# Patient Record
Sex: MALE | Race: WHITE
[De-identification: names, ages, dates, MRNs, and addresses within clinical notes are randomized per-mention and may not be internally consistent; named-entity substitution may affect disease eponyms.]

---

## 2017-01-16 ENCOUNTER — HOSPITAL ENCOUNTER (INPATIENT)
Dept: HOSPITAL 36 - ER | Age: 57
LOS: 5 days | Discharge: NURSING, REHAB OR LONG-TERM CARE | DRG: 391 | End: 2017-01-21
Attending: GENERAL PRACTICE | Admitting: GENERAL PRACTICE
Payer: MEDICARE

## 2017-01-16 VITALS — DIASTOLIC BLOOD PRESSURE: 85 MMHG | SYSTOLIC BLOOD PRESSURE: 124 MMHG

## 2017-01-16 DIAGNOSIS — M47.816: ICD-10-CM

## 2017-01-16 DIAGNOSIS — J45.909: ICD-10-CM

## 2017-01-16 DIAGNOSIS — J18.9: ICD-10-CM

## 2017-01-16 DIAGNOSIS — K52.9: Primary | ICD-10-CM

## 2017-01-16 DIAGNOSIS — Z98.890: ICD-10-CM

## 2017-01-16 DIAGNOSIS — K52.89: ICD-10-CM

## 2017-01-16 DIAGNOSIS — I10: ICD-10-CM

## 2017-01-16 DIAGNOSIS — J44.9: ICD-10-CM

## 2017-01-16 DIAGNOSIS — F17.210: ICD-10-CM

## 2017-01-16 DIAGNOSIS — N39.0: ICD-10-CM

## 2017-01-16 DIAGNOSIS — M19.90: ICD-10-CM

## 2017-01-16 DIAGNOSIS — Z88.8: ICD-10-CM

## 2017-01-16 DIAGNOSIS — Z88.5: ICD-10-CM

## 2017-01-16 LAB
ALBUMIN/GLOB SERPL: 1.4 {RATIO} (ref 1–1.8)
ALP SERPL-CCNC: 152 U/L (ref 34–104)
ALT SERPL-CCNC: 17 U/L (ref 7–52)
AMYLASE SERPL-CCNC: 38 U/L (ref 29–103)
ANION GAP SERPL CALC-SCNC: 11.6 MMOL/L (ref 7–16)
AST SERPL-CCNC: 15 U/L (ref 13–39)
BACTERIA #/AREA URNS HPF: (no result) /HPF
BASOPHILS NFR BLD AUTO: 0.2 % (ref 0–2)
BILIRUB SERPL-MCNC: 0.4 MG/DL (ref 0.3–1)
BILIRUB UR-MCNC: (no result) MG/DL
BUN SERPL-MCNC: 12 MG/DL (ref 7–25)
BUN/CREAT SERPL: 13.3
CALCIUM SERPL-MCNC: 10.2 MG/DL (ref 8.6–10.3)
CHLORIDE SERPL-SCNC: 108 MEQ/L (ref 98–107)
CO2 SERPL-SCNC: 21.2 MEQ/L (ref 21–31)
COLOR UR: YELLOW
CREAT SERPL-MCNC: 0.9 MG/DL (ref 0.7–1.3)
EOSINOPHIL NFR BLD AUTO: 1.2 % (ref 0–5)
EPI CELLS URNS QL MICRO: (no result) /LPF
ERYTHROCYTE [DISTWIDTH] IN BLOOD BY AUTOMATED COUNT: 12.6 % (ref 11.5–20)
GLOBULIN SER-MCNC: 3.2 GM/DL
GLUCOSE SERPL-MCNC: 112 MG/DL (ref 70–105)
GLUCOSE UR STRIP-MCNC: NEGATIVE MG/DL
HCT VFR BLD CALC: 40.6 % (ref 39–49)
HGB BLD-MCNC: 13.7 GM/DL (ref 13.2–17.3)
KETONES UR STRIP-MCNC: 15 MG/DL
LIPASE SERPL-CCNC: 18 U/L (ref 11–82)
LYMPHOCYTES NFR BLD AUTO: 12.9 % (ref 20–50)
MCH RBC QN AUTO: 31.7 PG (ref 26–30)
MCHC RBC AUTO-ENTMCNC: 33.8 PG (ref 28–36)
MCV RBC AUTO: 94 FL (ref 80–99)
MONOCYTES NFR BLD AUTO: 5.9 % (ref 2–10)
NEUTROPHILS # BLD: 4.6 TH/CMM (ref 1.8–8)
NEUTROPHILS NFR BLD AUTO: 79.8 % (ref 40–80)
PH UR STRIP: 5.5 [PH]
PLATELET # BLD: 334 TH/CMM (ref 150–400)
PMV BLD AUTO: 6.9 FL
POTASSIUM SERPL-SCNC: 3.8 MEQ/L (ref 3.5–5.1)
PROT UR STRIP-MCNC: 100 MG/DL
RBC # BLD AUTO: 4.32 MIL/CMM (ref 4.3–5.7)
RBC # UR STRIP: (no result) /UL
SODIUM SERPL-SCNC: 137 MEQ/L (ref 136–145)
UROBILINOGEN UR STRIP-ACNC: 0.2 E.U./DL (ref 0.2–1)
WBC # BLD AUTO: 5.7 TH/CMM (ref 4.8–10.8)
WBC #/AREA URNS HPF: (no result) /HPF (ref 0–5)

## 2017-01-16 PROCEDURE — Z7610: HCPCS

## 2017-01-16 PROCEDURE — A9537 TC99M MEBROFENIN: HCPCS

## 2017-01-16 PROCEDURE — Z7506: HCPCS

## 2017-01-16 RX ADMIN — HYDROMORPHONE HYDROCHLORIDE PRN MG: 1 INJECTION, SOLUTION INTRAMUSCULAR; INTRAVENOUS; SUBCUTANEOUS at 22:30

## 2017-01-16 RX ADMIN — LEVOFLOXACIN SCH MLS/HR: 5 INJECTION INTRAVENOUS at 22:30

## 2017-01-16 NOTE — DIAGNOSTIC IMAGING REPORT
CT scan abdomen and pelvis without intravenous contrast



HISTORY: Pain



Total DLP equals 293



CTDI equals 7.0



Axial sections were obtained from the xiphoid process down to the pubic

symphysis.



The exam is compared with prior study of November 12, 2016.



The liver exhibits a homogeneous parenchyma. No focal lesions. The

spleen appears normal. No abnormality seen in the region of the

pancreas. Bilateral punctate nonobstructing renal calculi are seen.

Little change from the prior study. Intraluminal density seen in the

gallbladder suggestive of cholelithiasis. If necessary, an ultrasound

exam would provide additional assessment.



There appears to be mild dilatation and wall thickening along the

ascending colon. Inflammatory change (colitis) cannot be excluded.

Clinical correlation is needed. No discrete abnormal masses or fluid

collections seen within the pelvis. Several colonic diverticula are

noted.



Degenerative changes noted to the spine. There is a stable 1.0 cm

sclerotic density noted within the left ilium of the pelvis. As an

isolated finding, the finding is probably related to an incidental "bone

island".



IMPRESSION:

1. Mild dilatation along with wall thickening along a segment of the

ascending colon. Inflammatory change (colitis or  diverticulitis) cannot

be excluded. Clinical correlation is needed

2. Stable bilateral punctate nonobstructing renal calculi

3. Intraluminal density within the gallbladder suggesting

cholelithiasis. An ultrasound exam would provide additional assessment.

4. Diverticulosis (see above)

## 2017-01-16 NOTE — ADMIT CRITERIA FORM
Admit Criteria Forms





- Admit Criteria


Diagnosis: 





                                                                         


                           ABDOMINAL PAIN





Clinical Indications for Admission to Inpatient Care


 


                                                                               

      (Place 'X' for any and all applicable criteria):





Admission is indicated for ANY ONE of the following(1)(2)(3)(4)(5):


[X ]I.      Inpatient admission required rather than observation care (Also use 

Abdominal Pain: Observation Care, 


           as appropriate) because of ANY ONE of the following:


            [ ]a)   Severe pain requiring acute inpatient management


            [ ]b)    Identification of etiology/finding that requires inpatient 

care (eg, aortic dissection, free air)


            [ ]c)    Absent bowel sounds with complete ileus(6)  


            [ ]d)    Suspected toxic megacolon


            [ ]e)    Severe electrolyte abnormalities requiring inpatient care


            [ ]f)     High fever or infection requiring inpatient admission as 

indicated by ANY ONE of following(7)(8):


                       [ ] i)    Appropriate outpatient or observational care 

antimicrobial treatment unavailable, not effective, or not feasible


                       [ ] ii)   Documented bacteremia 


                       [ ] iii)  Temperature > 104.9 degrees F (oral)


                       [ ] iv)  T >103.1 F (oral) or < 96.8 F(rectal) that does 

not respond to all emergency treatment measures


            [ ]g)     Signs of intestinal obstruction [B] 


            [ ]h)     Hemodynamic instability


            [ ]i)      IV fluid to replace significant ongoing losses (greater 

than 3 L/m2 per day) (12)(13)


            [ ]j)      Percutaneous or open drainage (eg, abscess, biliary tract

) procedures


            [ ]k)     Parenteral nutrition regimen that must be implemented on 

inpatient basis   


            [ X]l)      Other condition,treatment or monitoring requiring 

inpatient admission.


[ ]II.      Peritoneal signs present


[ ]III.     Surgery needed that cannot be performed on an ambulatory basis.


[ ]IV.    Evaluation requires patient to not eat or drink for extended period (

eg, more than 24 hours).





[ ]V.     Contraindications and/or Inappropriate clinical situations for 

Observational Care in patients with


           abdominal pain, when ANY ONE of the following is required:


           [ ]a)  Thorough evaluation is required to prevent catastrophic 

events due to delays in diagnosing  


                   (e.g.Mesenteric ischemia) 1,3


           [ ]b)  Patient with severe pathology or with chronic symptoms 

unlikely to improve in the ED stay (3)


[ ]VI.    General contraindications and/or Inappropriate clinical situations 

for Observational Care in patients


           with abdominal pain, when ANY ONE of the following is required:


           [ ]a)   Prediction of prolongation of LOS based on ANY ONE of the 

following may be considered as 


                    a contraindication for observational care 2, 3, 4, 5, 6, 7, 

8, 9, 10, 11


                    [ ]i)    Age > 65 yrs.


                    [ ]ii)   Patient arriving by ambulance


                    [ ]iii)  Patient with high acuity


                    [ ]iv)  Patient requiring vital sign monitoring


                    [ ]v)   Patient on IV medication


           [ ]b)   Systolic blood pressures  180mmHg 3,12


           [ ]c)   Patient with altered mental status including delirium and 

other alteration of consciousness, (3)


           [ ]d)   Patient whose discharge disposition will be to a skilled 

nursing home or rehabilitation home should 


                    not be managed in Emergency Department Observation Unit. 

CMS rule requires 3 days hospital stay before such placement.3,13


           [ ]e)   Patient with failure to thrive due to broad array of 

etiologies 3,16,17


           [ ]f)    Inability to ambulate 3,14








Extended stay beyond goal length of stay may be needed for(2)(3):


[ ]a)   Persistent abdominal pain with suspected intra-abdominal process


[ ]b)   Diagnosed condition requiring continued stay (e.g., pancreatitis, 

complicated diverticulitis)                                            


[ ]c)   Surgery (e.g., colectomy)                


    





The original [employer] content created by [employer] has been revised. 


The portions of the content which have been revised are identified through the 

use of italic text or in bold, and [email or web address] 


has neither reviewed nor approved the modified material.All other unmodified 

content is copyright  [facility].





Please see references footnoted in the original [facility] edition 

2016
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
## 2017-01-17 LAB
ALBUMIN/GLOB SERPL: 1.3 {RATIO} (ref 1–1.8)
ALP SERPL-CCNC: 116 U/L (ref 34–104)
ALT SERPL-CCNC: 12 U/L (ref 7–52)
ANION GAP SERPL CALC-SCNC: 7.9 MMOL/L (ref 7–16)
AST SERPL-CCNC: 11 U/L (ref 13–39)
BASOPHILS NFR BLD AUTO: 0.2 % (ref 0–2)
BILIRUB SERPL-MCNC: 0.4 MG/DL (ref 0.3–1)
BUN SERPL-MCNC: 11 MG/DL (ref 7–25)
BUN/CREAT SERPL: 12.2
CALCIUM SERPL-MCNC: 8.9 MG/DL (ref 8.6–10.3)
CHLORIDE SERPL-SCNC: 109 MEQ/L (ref 98–107)
CO2 SERPL-SCNC: 22.7 MEQ/L (ref 21–31)
CREAT SERPL-MCNC: 0.9 MG/DL (ref 0.7–1.3)
EOSINOPHIL NFR BLD AUTO: 6.8 % (ref 0–5)
ERYTHROCYTE [DISTWIDTH] IN BLOOD BY AUTOMATED COUNT: 12.5 % (ref 11.5–20)
GLOBULIN SER-MCNC: 2.8 GM/DL
GLUCOSE SERPL-MCNC: 95 MG/DL (ref 70–105)
HCT VFR BLD CALC: 34.9 % (ref 39–49)
HGB BLD-MCNC: 12.3 GM/DL (ref 13.2–17.3)
LYMPHOCYTES NFR BLD AUTO: 28.2 % (ref 20–50)
MCH RBC QN AUTO: 33 PG (ref 26–30)
MCHC RBC AUTO-ENTMCNC: 35.2 PG (ref 28–36)
MCV RBC AUTO: 93.6 FL (ref 80–99)
MONOCYTES NFR BLD AUTO: 8 % (ref 2–10)
NEUTROPHILS # BLD: 3 TH/CMM (ref 1.8–8)
NEUTROPHILS NFR BLD AUTO: 56.8 % (ref 40–80)
PLATELET # BLD: 276 TH/CMM (ref 150–400)
PMV BLD AUTO: 7 FL
POTASSIUM SERPL-SCNC: 3.6 MEQ/L (ref 3.5–5.1)
RBC # BLD AUTO: 3.73 MIL/CMM (ref 4.3–5.7)
SODIUM SERPL-SCNC: 136 MEQ/L (ref 136–145)
WBC # BLD AUTO: 5.1 TH/CMM (ref 4.8–10.8)

## 2017-01-17 RX ADMIN — HYDROMORPHONE HYDROCHLORIDE PRN MG: 1 INJECTION, SOLUTION INTRAMUSCULAR; INTRAVENOUS; SUBCUTANEOUS at 10:14

## 2017-01-17 RX ADMIN — ALBUTEROL SULFATE PRN MG: 2.5 SOLUTION RESPIRATORY (INHALATION) at 20:30

## 2017-01-17 RX ADMIN — HYDROMORPHONE HYDROCHLORIDE PRN MG: 1 INJECTION, SOLUTION INTRAMUSCULAR; INTRAVENOUS; SUBCUTANEOUS at 16:18

## 2017-01-17 RX ADMIN — METRONIDAZOLE SCH MLS/HR: 500 INJECTION, SOLUTION INTRAVENOUS at 12:10

## 2017-01-17 RX ADMIN — ALBUTEROL SULFATE PRN MG: 2.5 SOLUTION RESPIRATORY (INHALATION) at 10:48

## 2017-01-17 RX ADMIN — ALBUTEROL SULFATE PRN MG: 2.5 SOLUTION RESPIRATORY (INHALATION) at 04:16

## 2017-01-17 RX ADMIN — METRONIDAZOLE SCH MLS/HR: 500 INJECTION, SOLUTION INTRAVENOUS at 20:48

## 2017-01-17 RX ADMIN — LEVOFLOXACIN SCH MLS/HR: 5 INJECTION INTRAVENOUS at 22:22

## 2017-01-17 RX ADMIN — METRONIDAZOLE SCH MLS/HR: 500 INJECTION, SOLUTION INTRAVENOUS at 05:17

## 2017-01-17 RX ADMIN — HYDROMORPHONE HYDROCHLORIDE PRN MG: 1 INJECTION, SOLUTION INTRAMUSCULAR; INTRAVENOUS; SUBCUTANEOUS at 04:10

## 2017-01-17 NOTE — GENERAL PROGRESS NOTE
Subjective





- Review of Systems


Service Date: 01/17/17


Subjective: 


I want pain medication every 4 hours





Objective





- Results


Result Diagrams: 


 01/16/17 14:20





 01/16/17 14:20


Recent Labs: 


 Laboratory Last Values











WBC  5.7 Th/cmm (4.8-10.8)   01/16/17  14:20    


 


RBC  4.32 Mil/cmm (4.30-5.70)   01/16/17  14:20    


 


Hgb  13.7 gm/dL (13.2-17.3)   01/16/17  14:20    


 


Hct  40.6 % (39.0-49.0)   01/16/17  14:20    


 


MCV  94.0 fl (80-99)   01/16/17  14:20    


 


MCH  31.7 pg (26.0-30.0)  H  01/16/17  14:20    


 


MCHC Differential  33.8 pg (28.0-36.0)   01/16/17  14:20    


 


RDW  12.6 % (11.5-20.0)   01/16/17  14:20    


 


Plt Count  334 Th/cmm (150-400)  D 01/16/17  14:20    


 


MPV  6.9 fl  01/16/17  14:20    


 


Neutrophils %  79.8 % (40.0-80.0)   01/16/17  14:20    


 


Lymphocytes %  12.9 % (20.0-50.0)  L  01/16/17  14:20    


 


Monocytes %  5.9 % (2.0-10.0)   01/16/17  14:20    


 


Eosinophils %  1.2 % (0.0-5.0)   01/16/17  14:20    


 


Basophils %  0.2 % (0.0-2.0)   01/16/17  14:20    


 


Sodium  137 mEq/L (136-145)   01/16/17  14:20    


 


Potassium  3.8 mEq/L (3.5-5.1)   01/16/17  14:20    


 


Chloride  108 mEq/L ()  H  01/16/17  14:20    


 


Carbon Dioxide  21.2 mEq/L (21.0-31.0)   01/16/17  14:20    


 


Anion Gap  11.6  (7.0-16.0)   01/16/17  14:20    


 


BUN  12 mg/dL (7-25)   01/16/17  14:20    


 


Creatinine  0.9 mg/dL (0.7-1.3)   01/16/17  14:20    


 


Est GFR ( Amer)  > 60.0 ml/min  01/16/17  14:20    


 


Est GFR (Non-Af Amer)  > 60.0 ml/min  01/16/17  14:20    


 


BUN/Creatinine Ratio  13.3   01/16/17  14:20    


 


Glucose  112 mg/dL ()  H  01/16/17  14:20    


 


Whole Bld Lactic Acid  1.04 mmol/L (0.60-2.00)   01/16/17  14:20    


 


Calcium  10.2 mg/dL (8.6-10.3)   01/16/17  14:20    


 


Total Bilirubin  0.4 mg/dL (0.3-1.0)   01/16/17  14:20    


 


AST  15 U/L (13-39)   01/16/17  14:20    


 


ALT  17 U/L (7-52)   01/16/17  14:20    


 


Alkaline Phosphatase  152 U/L ()  H  01/16/17  14:20    


 


Troponin I  < 0.01 ng/mL (0.01-0.05)  L  01/16/17  14:20    


 


Total Protein  7.6 gm/dL (6.0-8.3)   01/16/17  14:20    


 


Albumin  4.4 gm/dL (4.2-5.5)   01/16/17  14:20    


 


Globulin  3.2 gm/dL  01/16/17  14:20    


 


Albumin/Globulin Ratio  1.4  (1.0-1.8)   01/16/17  14:20    


 


Amylase  38 U/L ()   01/16/17  14:20    


 


Lipase  18 U/L (11-82)   01/16/17  14:20    


 


Urine Source  RANDOM   01/16/17  15:55    


 


Urine Color  YELLOW   01/16/17  15:55    


 


Urine Clarity  CLEAR  (CLEAR)   01/16/17  15:55    


 


Urine pH  5.5   01/16/17  15:55    


 


Ur Specific Gravity  1.030  (1.005-1.030)   01/16/17  15:55    


 


Urine Protein  100 mg/dL (NEGATIVE)  H  01/16/17  15:55    


 


Urine Glucose (UA)  NEGATIVE mg/dL (NEGATIVE)   01/16/17  15:55    


 


Urine Ketones  15 mg/dL (NEGATIVE)  H  01/16/17  15:55    


 


Urine Blood  MODERATE  (NEGATIVE)  H  01/16/17  15:55    


 


Urine Nitrate  NEGATIVE  (NEGATIVE)   01/16/17  15:55    


 


Urine Bilirubin  SMALL  (NEGATIVE)  H  01/16/17  15:55    


 


Urine Urobilinogen  0.2 E.U./dL (0.2 - 1.0)   01/16/17  15:55    


 


Ur Leukocyte Esterase  NEGATIVE  (NEGATIVE)   01/16/17  15:55    


 


Urine RBC  10-25 /hpf (0-5)  H  01/16/17  15:55    


 


Urine WBC  2-5 /hpf (0-5)  H  01/16/17  15:55    


 


Ur Epithelial Cells  NONE SEEN /lpf (FEW)   01/16/17  15:55    


 


Urine Bacteria  NONE SEEN /hpf (NONE SEEN)   01/16/17  15:55    














- Physical Exam


Vitals and I&O: 


 Vital Signs











Temp  98.2 F   01/17/17 03:58


 


Pulse  85   01/17/17 08:31


 


Resp  18   01/17/17 07:07


 


BP  154/98   01/17/17 08:31


 


Pulse Ox  94   01/17/17 07:07








 Intake & Output











 01/16/17 01/17/17 01/17/17





 18:59 06:59 18:59


 


Intake Total  200 


 


Output Total  200 


 


Balance  0 


 


Intake:   


 


  Oral  200 


 


Output:   


 


  Urine  200 


 


Other:   


 


  Stool Characteristics  Soft 











Active Medications: 


 Current Medications





Albuterol Sulfate (Albuterol 2.5mg/3ml Neb Ud)  2.5 mg HHN Q8HR PRN


   PRN Reason: sob


   Stop: 03/17/17 21:50


   Last Admin: 01/17/17 04:16 Dose:  2.5 mg


Amlodipine Besylate (Norvasc)  10 mg PO DAILY [facility]


   Stop: 03/18/17 08:59


Hydromorphone HCl (Dilaudid)  1 mg IVP Q6HR PRN


   PRN Reason: Pain (Moderate)


   Stop: 03/17/17 21:30


   Last Admin: 01/17/17 04:10 Dose:  1 mg


Sodium Chloride (Nacl 0.9%)  1,000 mls @ 75 mls/hr IV .[id number] [facility]


   Stop: 03/17/17 21:29


   Last Admin: 01/16/17 22:00 Dose:  75 mls/hr


Levofloxacin (Levaquin Pb)  500 mg in 100 mls @ 100 mls/hr IV Q24HR [facility]


   Stop: 03/17/17 21:29


   Last Admin: 01/16/17 22:30 Dose:  100 mls/hr


Metronidazole (Flagyl)  500 mg in 100 mls @ 100 mls/hr IV Q8HR [facility]


   Stop: 03/18/17 04:59


   Last Admin: 01/17/17 05:17 Dose:  100 mls/hr


Propranolol HCl (Inderal)  40 mg PO DAILY [facility]


   Stop: 03/18/17 08:59


   Last Admin: 01/17/17 08:31 Dose:  40 mg








General: Alert, Oriented x3, Cooperative


HEENT: Atraumatic


Neck: Supple


Cardiovascular: Regular rate


Lungs: Clear to auscultation


Abdomen: Other (Pain at palpation, BS increased)


Extremities: Other (No edema)


Neurological: Normal gait


Skin: Other (Multiplr skin scars in abdomen)


Psych/Mental Status: Mental status NL





- Procedures


Procedures: 


 Procedures











Procedure Code Date


 


INJECT/INFUSE NEC 99.29 03/30/15


 


VENOUS PUNCTURE NEC 38.99 10/18/14














Assessment/Plan





- Problem List


Patient Problems: 


All Active Problems





GENERALIZED ABDOMINAL PAIN (Acute) 


Abdominal pain (Active) R10.9


Kidney stone (Active 07/13/13) N20.0


Abdominal pain in male (Acute) R10.9


Dyspnea (Acute) R06.00


Flank pain (Acute 04/18/15) R10.9


Flank pain, chronic (Acute) R10.9


Left flank pain (Acute) R10.9


Lower abdominal pain (Acute) R10.30











- Assessment


Assessment: 


Pain is awake, alert, calm.  Dx: Abdominal pain possible secondary to colitis, 

UTI, HTN, COPD





- Plan


Plan: 


Patient in IV levaquin and metronidazole, IV ns, Dilaudid.  Awaiting GI eval.

## 2017-01-17 NOTE — DIAGNOSTIC IMAGING REPORT
Abdominal ultrasound



HISTORY: Pain



The liver appears enlarged. No focal lesions. The exam of the

gallbladder demonstrates 2 intraluminal echogenic densities. Despite the

absence of acoustic shadowing, findings are most likely related to

gallstones. The largest measures 9 mm. In addition, several low-level

intraluminal echoes that may be related to "sludge" noted. No biliary

dilatation (common bile duct is 3 mm). The pancreas is not well

visualized due to bowel gas. The kidneys appear normal bilaterally. No

other definite retroperitoneal or intra-abdominal abnormalities.



IMPRESSION:

1. Findings consistent with cholelithiasis

2. Hepatomegaly

## 2017-01-17 NOTE — HISTORY & PHYSICAL
CHIEF COMPLAINT:  Abdominal pain.



HISTORY OF PRESENT ILLNESS:  This is the case of a white male who came referring

abdominal pain for 1 week.  The patient referred occasionally pain radiates to

the back and both sides of the abdomen.  Pain is 8-10 and increased when walks

or move.  The patient referred that when he gets the pain, he feels the

sensation to have a bowel movement.  The patient had nausea with vomiting 3

times yesterday.  No diarrhea.



PAST MEDICAL HISTORY:  The patient has past medical history of hypertension,

asthma, COPD, history of multiple renal stones that required lithotripsy and

surgery, osteoarthritis, and ____ skin and bones ____ in the abdomen.



SOCIAL HISTORY:  The patient smokes half a pack daily.  The patient denies use

of alcohol or drugs.  The patient lives with wife at home.



FAMILY HISTORY:  Unremarkable.



REVIEW OF SYSTEMS:

GENERAL:  The patient denies fever or chills.

LUNGS:  The patient denies shortness of breath.

HEART:  The patient denies chest pain.

ABDOMEN:  The patient referred abdominal pain.

EXTREMITIES:  Unremarkable.

NEUROLOGICAL:  Unremarkable.



PHYSICAL EXAMINATION:

GENERAL:  Does reveal fairly nourished and developed white male, awake, alert

and complaining of abdominal pain.

HEENT:  Head is normocephalic and atraumatic.  Eyes:  Pupils are reactive to

light.  Nose:  No evidence of nasal obstruction.  Ears:  No evidence of any

discharge.  Mouth, some teeth missing with multiple caries.

LUNGS:  Bilateral air entry.  No wheezing, no crackles.

HEART:  Regular rate and rhythm.

ABDOMEN:  Soft, tender on palpation in all abdomen.  Shows scars for ____ in all

abdomen.  Bowel sound is increased.

EXTREMITIES:  Full movement of all extremities.  No edema.

NEUROLOGIC:  The patient is awake, alert, in some distress secondary to

abdominal pain.  Nerves 2-12 grossly intact.  No neurological deficit at this

moment.



IMPRESSION:

1.  Abdominal pain, possible secondary to colitis.

2.  Urinary tract infection.

3.  Hypertension.

4.  Asthma.

5.  Chronic obstructive pulmonary disease.



PLAN:

1.  The patient will be admitted in the medical surgical floor.

2.  Dilaudid for pain control.

3.  Levaquin.

4.  Metronidazole.

5.  IV normal saline.

6.  Consult with GI.

7.  Continue with home medications.





DD: 01/17/2017 09:04

DT: 01/17/2017 11:31

JOB# [id number]  [id number]

## 2017-01-18 LAB
ALBUMIN/GLOB SERPL: 1.2 {RATIO} (ref 1–1.8)
ALP SERPL-CCNC: 122 U/L (ref 34–104)
ALT SERPL-CCNC: 13 U/L (ref 7–52)
ANION GAP SERPL CALC-SCNC: 11 MMOL/L (ref 7–16)
AST SERPL-CCNC: 15 U/L (ref 13–39)
BASOPHILS NFR BLD AUTO: 0.3 % (ref 0–2)
BILIRUB SERPL-MCNC: 0.4 MG/DL (ref 0.3–1)
BUN SERPL-MCNC: 8 MG/DL (ref 7–25)
BUN/CREAT SERPL: 11.4
CALCIUM SERPL-MCNC: 9.2 MG/DL (ref 8.6–10.3)
CHLORIDE SERPL-SCNC: 107 MEQ/L (ref 98–107)
CO2 SERPL-SCNC: 22.2 MEQ/L (ref 21–31)
CREAT SERPL-MCNC: 0.7 MG/DL (ref 0.7–1.3)
EOSINOPHIL NFR BLD AUTO: 5.3 % (ref 0–5)
ERYTHROCYTE [DISTWIDTH] IN BLOOD BY AUTOMATED COUNT: 12.5 % (ref 11.5–20)
GLOBULIN SER-MCNC: 3.1 GM/DL
GLUCOSE SERPL-MCNC: 88 MG/DL (ref 70–105)
HCT VFR BLD CALC: 39.3 % (ref 39–49)
HGB BLD-MCNC: 13.4 GM/DL (ref 13.2–17.3)
INR PPP: 1.06 (ref 0.5–1.4)
LYMPHOCYTES NFR BLD AUTO: 28.4 % (ref 20–50)
MCH RBC QN AUTO: 31.9 PG (ref 26–30)
MCHC RBC AUTO-ENTMCNC: 34 PG (ref 28–36)
MCV RBC AUTO: 93.9 FL (ref 80–99)
MONOCYTES NFR BLD AUTO: 8.7 % (ref 2–10)
NEUTROPHILS # BLD: 3.1 TH/CMM (ref 1.8–8)
NEUTROPHILS NFR BLD AUTO: 57.3 % (ref 40–80)
PLATELET # BLD: 277 TH/CMM (ref 150–400)
PMV BLD AUTO: 7 FL
POTASSIUM SERPL-SCNC: 3.2 MEQ/L (ref 3.5–5.1)
PROTHROMBIN TIME: 10.5 SECONDS (ref 9.5–11.5)
RBC # BLD AUTO: 4.19 MIL/CMM (ref 4.3–5.7)
SODIUM SERPL-SCNC: 137 MEQ/L (ref 136–145)
WBC # BLD AUTO: 5.4 TH/CMM (ref 4.8–10.8)

## 2017-01-18 PROCEDURE — 0DBK8ZX EXCISION OF ASCENDING COLON, VIA NATURAL OR ARTIFICIAL OPENING ENDOSCOPIC, DIAGNOSTIC: ICD-10-PCS

## 2017-01-18 RX ADMIN — CASTOR OIL AND BALSAM, PERU SCH APPL: 788; 87 OINTMENT TOPICAL at 22:18

## 2017-01-18 RX ADMIN — LEVOFLOXACIN SCH MLS/HR: 5 INJECTION INTRAVENOUS at 23:55

## 2017-01-18 RX ADMIN — HYDROMORPHONE HYDROCHLORIDE PRN MG: 1 INJECTION, SOLUTION INTRAMUSCULAR; INTRAVENOUS; SUBCUTANEOUS at 22:15

## 2017-01-18 RX ADMIN — METRONIDAZOLE SCH MLS/HR: 500 INJECTION, SOLUTION INTRAVENOUS at 05:29

## 2017-01-18 RX ADMIN — ALBUTEROL SULFATE PRN MG: 2.5 SOLUTION RESPIRATORY (INHALATION) at 05:44

## 2017-01-18 RX ADMIN — HYDROMORPHONE HYDROCHLORIDE PRN MG: 1 INJECTION, SOLUTION INTRAMUSCULAR; INTRAVENOUS; SUBCUTANEOUS at 16:09

## 2017-01-18 RX ADMIN — HYDROMORPHONE HYDROCHLORIDE PRN MG: 1 INJECTION, SOLUTION INTRAMUSCULAR; INTRAVENOUS; SUBCUTANEOUS at 02:26

## 2017-01-18 RX ADMIN — ALBUTEROL SULFATE PRN MG: 2.5 SOLUTION RESPIRATORY (INHALATION) at 15:48

## 2017-01-18 RX ADMIN — METRONIDAZOLE SCH MLS/HR: 500 INJECTION, SOLUTION INTRAVENOUS at 22:10

## 2017-01-18 RX ADMIN — HYDROMORPHONE HYDROCHLORIDE PRN MG: 1 INJECTION, SOLUTION INTRAMUSCULAR; INTRAVENOUS; SUBCUTANEOUS at 10:03

## 2017-01-18 RX ADMIN — METRONIDAZOLE SCH MLS/HR: 500 INJECTION, SOLUTION INTRAVENOUS at 12:31

## 2017-01-18 NOTE — DIAGNOSTIC IMAGING REPORT
CHEST X-RAY: AP view



INDICATION: Shortness of breath



COMPARISON: Chest x-ray 9/12/2016



FINDINGS: COPD lung changes are seen with developing increased right mid

lung markings. No pleural abnormalities. Heart size is normal. There is

probable minimal atherosclerosis of the aortic arch.



IMPRESSION:



COPD lung changes with slight increased right mid lung markings.

Developing infiltrate in this region cannot be excluded. Follow-up is

recommended.

## 2017-01-18 NOTE — GENERAL PROGRESS NOTE
Subjective





- Review of Systems


Service Date: 01/18/17


Subjective: 


I want more pain medication





Objective





- Results


Result Diagrams: 


 01/18/17 05:31





 01/18/17 05:31


Recent Labs: 


 Laboratory Last Values











WBC  5.4 Th/cmm (4.8-10.8)   01/18/17  05:31    


 


RBC  4.19 Mil/cmm (4.30-5.70)  L  01/18/17  05:31    


 


Hgb  13.4 gm/dL (13.2-17.3)   01/18/17  05:31    


 


Hct  39.3 % (39.0-49.0)  D 01/18/17  05:31    


 


MCV  93.9 fl (80-99)   01/18/17  05:31    


 


MCH  31.9 pg (26.0-30.0)  H  01/18/17  05:31    


 


MCHC Differential  34.0 pg (28.0-36.0)   01/18/17  05:31    


 


RDW  12.5 % (11.5-20.0)   01/18/17  05:31    


 


Plt Count  277 Th/cmm (150-400)   01/18/17  05:31    


 


MPV  7.0 fl  01/18/17  05:31    


 


Neutrophils %  57.3 % (40.0-80.0)   01/18/17  05:31    


 


Lymphocytes %  28.4 % (20.0-50.0)   01/18/17  05:31    


 


Monocytes %  8.7 % (2.0-10.0)   01/18/17  05:31    


 


Eosinophils %  5.3 % (0.0-5.0)  H  01/18/17  05:31    


 


Basophils %  0.3 % (0.0-2.0)   01/18/17  05:31    


 


PT  10.5 SECONDS (9.5-11.5)   01/18/17  05:31    


 


INR  1.06  (0.5-1.4)   01/18/17  05:31    


 


Sodium  137 mEq/L (136-145)   01/18/17  05:31    


 


Potassium  3.2 mEq/L (3.5-5.1)  L  01/18/17  05:31    


 


Chloride  107 mEq/L ()   01/18/17  05:31    


 


Carbon Dioxide  22.2 mEq/L (21.0-31.0)   01/18/17  05:31    


 


Anion Gap  11.0  (7.0-16.0)   01/18/17  05:31    


 


BUN  8 mg/dL (7-25)   01/18/17  05:31    


 


Creatinine  0.7 mg/dL (0.7-1.3)   01/18/17  05:31    


 


Est GFR ( Amer)  > 60.0 ml/min  01/18/17  05:31    


 


Est GFR (Non-Af Amer)  > 60.0 ml/min  01/18/17  05:31    


 


BUN/Creatinine Ratio  11.4   01/18/17  05:31    


 


Glucose  88 mg/dL ()   01/18/17  05:31    


 


Whole Bld Lactic Acid  1.06 mmol/L (0.60-2.00)   01/17/17  13:05    


 


Calcium  9.2 mg/dL (8.6-10.3)   01/18/17  05:31    


 


Total Bilirubin  0.4 mg/dL (0.3-1.0)   01/18/17  05:31    


 


AST  15 U/L (13-39)   01/18/17  05:31    


 


ALT  13 U/L (7-52)   01/18/17  05:31    


 


Alkaline Phosphatase  122 U/L ()  H  01/18/17  05:31    


 


Troponin I  < 0.01 ng/mL (0.01-0.05)  L  01/16/17  14:20    


 


Total Protein  6.8 gm/dL (6.0-8.3)   01/18/17  05:31    


 


Albumin  3.7 gm/dL (4.2-5.5)  L  01/18/17  05:31    


 


Globulin  3.1 gm/dL  01/18/17  05:31    


 


Albumin/Globulin Ratio  1.2  (1.0-1.8)   01/18/17  05:31    


 


Amylase  38 U/L ()   01/16/17  14:20    


 


Lipase  18 U/L (11-82)   01/16/17  14:20    


 


Urine Source  RANDOM   01/16/17  15:55    


 


Urine Color  YELLOW   01/16/17  15:55    


 


Urine Clarity  CLEAR  (CLEAR)   01/16/17  15:55    


 


Urine pH  5.5   01/16/17  15:55    


 


Ur Specific Gravity  1.030  (1.005-1.030)   01/16/17  15:55    


 


Urine Protein  100 mg/dL (NEGATIVE)  H  01/16/17  15:55    


 


Urine Glucose (UA)  NEGATIVE mg/dL (NEGATIVE)   01/16/17  15:55    


 


Urine Ketones  15 mg/dL (NEGATIVE)  H  01/16/17  15:55    


 


Urine Blood  MODERATE  (NEGATIVE)  H  01/16/17  15:55    


 


Urine Nitrate  NEGATIVE  (NEGATIVE)   01/16/17  15:55    


 


Urine Bilirubin  SMALL  (NEGATIVE)  H  01/16/17  15:55    


 


Urine Urobilinogen  0.2 E.U./dL (0.2 - 1.0)   01/16/17  15:55    


 


Ur Leukocyte Esterase  NEGATIVE  (NEGATIVE)   01/16/17  15:55    


 


Urine RBC  10-25 /hpf (0-5)  H  01/16/17  15:55    


 


Urine WBC  2-5 /hpf (0-5)  H  01/16/17  15:55    


 


Ur Epithelial Cells  NONE SEEN /lpf (FEW)   01/16/17  15:55    


 


Urine Bacteria  NONE SEEN /hpf (NONE SEEN)   01/16/17  15:55    














- Physical Exam


Vitals and I&O: 


 Vital Signs











Temp  97.8 F   01/18/17 08:00


 


Pulse  79   01/18/17 08:09


 


Resp  18   01/18/17 08:00


 


BP  157/99   01/18/17 08:09


 


Pulse Ox  96   01/18/17 08:00








 Intake & Output











 01/17/17 01/18/17 01/18/17





 18:59 06:59 18:59


 


Intake Total 1600 2800 


 


Output Total  409 


 


Balance 1600 2391 


 


Intake:   


 


  Intake, IV Amount 1100 300 


 


    Levofloxacin 500mg/100mL  100 





    500 mg In 100 ml @ 100   





    mls/hr IV Q24HR [facility] Rx#:   





    [id number]   


 


    Sodium Chloride 0.9% 1, 1000  





    000 ml @ 75 mls/hr IV .   





    [id number] [facility] Rx#:[id number]   


 


    metroNIDAZOLE 500mg/ 200 





    100mL 500 mg In 100 ml @   





    100 mls/hr IV Q8HR [facility] Rx   





    #:[id number]   


 


  Oral 500 2500 


 


Output:   


 


  Urine  400 


 


  Stool  9 


 


Other:   


 


  # Voids 4 6 


 


  # Bowel Movements 5 12 











Active Medications: 


 Current Medications





Albuterol Sulfate (Albuterol 2.5mg/3ml Neb Ud)  2.5 mg HHN Q8HR PRN


   PRN Reason: sob


   Stop: 03/17/17 21:50


   Last Admin: 01/18/17 05:44 Dose:  2.5 mg


Amlodipine Besylate (Norvasc)  10 mg PO DAILY [facility]


   Stop: 03/18/17 08:59


   Last Admin: 01/18/17 08:09 Dose:  10 mg


Hydromorphone HCl (Dilaudid)  1 mg IVP Q6HR PRN


   PRN Reason: Pain (Moderate)


   Stop: 03/17/17 21:30


   Last Admin: 01/18/17 02:26 Dose:  1 mg


Sodium Chloride (Nacl 0.9%)  1,000 mls @ 75 mls/hr IV .[id number] [facility]


   Stop: 03/17/17 21:29


   Last Admin: 01/17/17 17:20 Dose:  75 mls/hr


Levofloxacin (Levaquin Pb)  500 mg in 100 mls @ 100 mls/hr IV Q24HR [facility]


   Stop: 03/17/17 21:29


   Last Infusion: 01/17/17 23:25 Dose:  Infused


Metronidazole (Flagyl)  500 mg in 100 mls @ 100 mls/hr IV Q8HR [facility]


   Stop: 03/18/17 04:59


   Last Infusion: 01/18/17 06:28 Dose:  Infused








General: Alert, Oriented x3, Cooperative, No acute distress


HEENT: Atraumatic


Neck: Supple


Cardiovascular: Regular rate


Lungs: Clear to auscultation


Abdomen: Bowel sounds, Soft (Tender at palpation )


Extremities: Other (No edema)


Neurological: Normal gait


Skin: Other (Warm and dry)


Psych/Mental Status: Mental status NL





- Procedures


Procedures: 


 Procedures











Procedure Code Date


 


INJECT/INFUSE NEC 99.29 03/30/15


 


VENOUS PUNCTURE NEC 38.99 10/18/14














Assessment/Plan





- Problem List


Patient Problems: 


All Active Problems





GENERALIZED ABDOMINAL PAIN (Acute) 


Abdominal pain (Active) R10.9


Kidney stone (Active 07/13/13) N20.0


Abdominal pain in male (Acute) R10.9


Dyspnea (Acute) R06.00


Flank pain (Acute 04/18/15) R10.9


Flank pain, chronic (Acute) R10.9


Left flank pain (Acute) R10.9


Lower abdominal pain (Acute) R10.30











- Assessment


Assessment: 


Pain is awake, alert, calm.  Dx: Abdominal pain possible secondary to colitis, 

UTI, HTN, COPD





- Plan


Plan: 


Patient in IV levaquin and metronidazole, IV ns, Dilaudid.  Colonoscopy will be 

done today

## 2017-01-18 NOTE — OPERATIVE REPORT
INPATIENT GASTROINTESTINAL PROCEDURE



PROCEDURE:  Colonoscopy with biopsy.



REFERRING PHYSICIAN:  Dr. Efra Hayes.



REASON FOR PROCEDURE:  Colitis.



CONSENT:  Risks, benefits, alternatives, nature, indication, possible outcomes

were discussed.  Mentioned bleeding, infection, perforation, death, disability,

cardiopulmonary distress and arrest, missed lesion and cancers, need for

surgery.  The patient expressed understanding and provided informed consent.



PREOPERATIVE DIAGNOSIS:  Colitis.



POSTOPERATIVE DIAGNOSIS:  Right-sided colitis.



MEDICATIONS:  Provided by anesthesiologist.



DESCRIPTION OF PROCEDURE:  The patient was placed on left side.  Rectal exam was

performed and was normal.  Pediatric colonoscope was advanced from the anus to

the cecum.  The appendiceal orifices were seen.  There was pseudomembranous

colitis in this particular area in the proximal ascending colon.  Terminal ileum

was intubated and appeared to be normal.  Scope slowly withdrawn ____ mucosa

along the way.  Stool was collected.  Biopsies were taken in the right side of

the colon.  Once in the rectum, retroflexion was performed, scope was

straightened and removed along with air.



COMPLICATIONS:  None.



FINDINGS:

1.  Colitis, primarily in the right side of the colon, status post biopsy and

stool collection.

2.  Normal terminal ileum.



RECOMMENDATIONS:

1.  Follow up on biopsy and stools cultures.

2.  Continue antibiotics.



Thank you for allowing me to participate.  Please call me if you have any

questions.





DD: 01/18/2017 09:08

DT: 01/18/2017 14:49

JOB# [id number]  [id number]

## 2017-01-18 NOTE — CONSULTATION
INPATIENT GI CONSULT



REFERRING PHYSICIAN:  Dr. Efra Hayes.



REASON FOR CONSULTATION:  Colitis.



HISTORY OF PRESENT ILLNESS:  This is a 56-year-old male who has been having

abdominal pain for approximately one week across his abdomen that was quite

severe.  Had some nausea and vomiting, but no hematemesis or coffee-ground

emesis.  Denies having any diarrhea, but has urgency.



PAST MEDICAL HISTORY:  Include hypertension, asthma, COPD, kidney stones, and

osteoarthritis.



PAST SURGICAL HISTORY:  None to abdomen.



FAMILY HISTORY:  Noncontributory.



SOCIAL HISTORY:  Smokes tobacco.  No alcohol or IV drug usage.



ALLERGIES:  KETOROLAC and MORPHINE.



CURRENT MEDICATIONS:  Albuterol, Norvasc, Dilaudid, Levaquin, Flagyl, and

Inderal.



REVIEW OF SYSTEMS:  Ten-point review of systems was performed and the pertinent

positive was the abdominal pain, nausea, and vomiting.  All other systems were

otherwise negative.



PHYSICAL EXAMINATION:

VITAL SIGNS:  Temperature 98.4, breathing 18, pulse 85, blood pressure _____/98,

and satting 91%.

GENERAL:  In no apparent distress.

EYES:  Anicteric, normal conjunctivae.

HEENT:  Normocephalic and atraumatic.  Moist mucous membranes.

NECK:  Soft, supple.

CHEST:  Clear.  Normal effort.

CARDIOVASCULAR:  Regular rate and rhythm.

ABDOMEN:  Soft and nondistended.  Tender abdomen.  No rebound or guarding.

SKIN:  Warm, dry.

EXTREMITIES:  Reveal no cyanosis.

PSYCHOLOGIC:  Alert and oriented x 3.



CT showed mild dilatation along with thickening of the ascending colon.



LABORATORY DATA:  Show white count 5.1, hemoglobin 12.3, and platelets of

276,000.  Total bilirubin 0.4, AST 11, ALT 12, alkaline phosphatase 116, and

lipase 18.



IMPRESSION:  This is a 56-year-old male with colitis, etiology is unclear, could

be due to an infectious versus underlying inflammatory bowel disease versus

ischemia.  Colonoscopy can be pursued to evaluate the colon on more specific

basis because of the findings on CAT scan.  I did discuss with the patient about

the colonoscopy that in his setting there is a high risk of perforation because

of colitis, but the benefit would be to determine the underlying etiology and to

address it.  The patient understands these risks and is willing to have the

procedure.  He was also made aware besides the risk of perforation, need for

surgery and colostomy that there is also risk of bleeding and infection, and

agreed.



PLAN:

1. Colonoscopy.

2. Continue antibiotics.

3. Check a lactic acid level.

4. If condition gets worse, may need surgery.

5. Check Protime.



Thank you for allowing me to participate.  Please call me if you have any

questions.





DD: 01/17/2017 12:32

DT: 01/18/2017 01:08

JOB# [id number]  [id number]

## 2017-01-19 LAB
ALBUMIN/GLOB SERPL: 1.2 {RATIO} (ref 1–1.8)
ALP SERPL-CCNC: 107 U/L (ref 34–104)
ALT SERPL-CCNC: 10 U/L (ref 7–52)
ANION GAP SERPL CALC-SCNC: 5.3 MMOL/L (ref 7–16)
AST SERPL-CCNC: 10 U/L (ref 13–39)
BASOPHILS NFR BLD AUTO: 0.5 % (ref 0–2)
BILIRUB SERPL-MCNC: 0.4 MG/DL (ref 0.3–1)
BUN SERPL-MCNC: 8 MG/DL (ref 7–25)
BUN/CREAT SERPL: 10
CALCIUM SERPL-MCNC: 8.9 MG/DL (ref 8.6–10.3)
CHLORIDE SERPL-SCNC: 111 MEQ/L (ref 98–107)
CO2 SERPL-SCNC: 23 MEQ/L (ref 21–31)
CREAT SERPL-MCNC: 0.8 MG/DL (ref 0.7–1.3)
EOSINOPHIL NFR BLD AUTO: 7.6 % (ref 0–5)
ERYTHROCYTE [DISTWIDTH] IN BLOOD BY AUTOMATED COUNT: 12.3 % (ref 11.5–20)
GLOBULIN SER-MCNC: 2.8 GM/DL
GLUCOSE SERPL-MCNC: 91 MG/DL (ref 70–105)
HCT VFR BLD CALC: 38.1 % (ref 39–49)
HGB BLD-MCNC: 13 GM/DL (ref 13.2–17.3)
LYMPHOCYTES NFR BLD AUTO: 30.2 % (ref 20–50)
MCH RBC QN AUTO: 32.3 PG (ref 26–30)
MCHC RBC AUTO-ENTMCNC: 34 PG (ref 28–36)
MCV RBC AUTO: 95 FL (ref 80–99)
MONOCYTES NFR BLD AUTO: 11.4 % (ref 2–10)
NEUTROPHILS # BLD: 2.1 TH/CMM (ref 1.8–8)
NEUTROPHILS NFR BLD AUTO: 50.3 % (ref 40–80)
PLATELET # BLD: 289 TH/CMM (ref 150–400)
PMV BLD AUTO: 6.8 FL
POTASSIUM SERPL-SCNC: 3.3 MEQ/L (ref 3.5–5.1)
RBC # BLD AUTO: 4.01 MIL/CMM (ref 4.3–5.7)
SODIUM SERPL-SCNC: 136 MEQ/L (ref 136–145)
WBC # BLD AUTO: 4.1 TH/CMM (ref 4.8–10.8)

## 2017-01-19 RX ADMIN — METRONIDAZOLE SCH MLS/HR: 500 INJECTION, SOLUTION INTRAVENOUS at 04:11

## 2017-01-19 RX ADMIN — HYDROMORPHONE HYDROCHLORIDE PRN MG: 1 INJECTION, SOLUTION INTRAMUSCULAR; INTRAVENOUS; SUBCUTANEOUS at 14:25

## 2017-01-19 RX ADMIN — ALBUTEROL SULFATE PRN MG: 2.5 SOLUTION RESPIRATORY (INHALATION) at 20:02

## 2017-01-19 RX ADMIN — CASTOR OIL AND BALSAM, PERU SCH APPL: 788; 87 OINTMENT TOPICAL at 09:38

## 2017-01-19 RX ADMIN — ALBUTEROL SULFATE PRN MG: 2.5 SOLUTION RESPIRATORY (INHALATION) at 04:08

## 2017-01-19 RX ADMIN — LEVOFLOXACIN SCH MLS/HR: 5 INJECTION INTRAVENOUS at 22:02

## 2017-01-19 RX ADMIN — HYDROMORPHONE HYDROCHLORIDE PRN MG: 1 INJECTION, SOLUTION INTRAMUSCULAR; INTRAVENOUS; SUBCUTANEOUS at 20:52

## 2017-01-19 RX ADMIN — METRONIDAZOLE SCH MLS/HR: 500 INJECTION, SOLUTION INTRAVENOUS at 20:53

## 2017-01-19 RX ADMIN — HYDROMORPHONE HYDROCHLORIDE PRN MG: 1 INJECTION, SOLUTION INTRAMUSCULAR; INTRAVENOUS; SUBCUTANEOUS at 04:20

## 2017-01-19 RX ADMIN — METRONIDAZOLE SCH MLS/HR: 500 INJECTION, SOLUTION INTRAVENOUS at 12:17

## 2017-01-19 RX ADMIN — HYDROMORPHONE HYDROCHLORIDE PRN MG: 1 INJECTION, SOLUTION INTRAMUSCULAR; INTRAVENOUS; SUBCUTANEOUS at 09:38

## 2017-01-19 NOTE — PATHOLOGY REPORT
Collection date:  1/18/17



Surgeon: Dr. EVANS Chavez



Specimen Description:  Right colon biopsy.



Gross Description:  Received in formalin are two tan soft tissue fragments

ranging from 0.1 and 0.2 cm in greatest dimension. Totally submitted in one

cassette.



Microscopic Description:  The histologic sections show ulcerated colon mucosa

with acute and chronic inflammation present consisting of increased numbers of

neutrophils and mixed with lymphocytes and plasma cells.  No definite

pseudomembranes are appreciated, although focal areas do show some mucinous

exudate containing large collections of neutrophils.  There is no evidence for

atypia.



Diagnosis:  Acute colitis with mucosal ulceration and mucinous inflammatory

exudate (right colon biopsy).



Comment:  The inflammatory exudate is somewhat suggested but not definitive for

pseudomembranous colitis.  Recommend clinical correlation and follow up with the

results of microbiological studies for C. difficile toxin.  These findings are

discussed with Dr. EVANS Chavez on 1/19/17, the report is also sent to his office.





DD: 01/19/2017 11:28

DT: 01/19/2017 11:53

[facility]# [id number]  [id number]

[facility]

## 2017-01-19 NOTE — GENERAL PROGRESS NOTE
Subjective





- Review of Systems


Service Date: 01/19/17


Subjective: 


I want more pain medication





Objective





- Results


Result Diagrams: 


 01/19/17 05:42





 01/19/17 05:42


Recent Labs: 


 Laboratory Last Values











WBC  4.1 Th/cmm (4.8-10.8)  L D 01/19/17  05:42    


 


RBC  4.01 Mil/cmm (4.30-5.70)  L  01/19/17  05:42    


 


Hgb  13.0 gm/dL (13.2-17.3)  L  01/19/17  05:42    


 


Hct  38.1 % (39.0-49.0)  L  01/19/17  05:42    


 


MCV  95.0 fl (80-99)   01/19/17  05:42    


 


MCH  32.3 pg (26.0-30.0)  H  01/19/17  05:42    


 


MCHC Differential  34.0 pg (28.0-36.0)   01/19/17  05:42    


 


RDW  12.3 % (11.5-20.0)   01/19/17  05:42    


 


Plt Count  289 Th/cmm (150-400)   01/19/17  05:42    


 


MPV  6.8 fl  01/19/17  05:42    


 


Neutrophils %  50.3 % (40.0-80.0)   01/19/17  05:42    


 


Lymphocytes %  30.2 % (20.0-50.0)   01/19/17  05:42    


 


Monocytes %  11.4 % (2.0-10.0)  H  01/19/17  05:42    


 


Eosinophils %  7.6 % (0.0-5.0)  H  01/19/17  05:42    


 


Basophils %  0.5 % (0.0-2.0)   01/19/17  05:42    


 


PT  10.5 SECONDS (9.5-11.5)   01/18/17  05:31    


 


INR  1.06  (0.5-1.4)   01/18/17  05:31    


 


Sodium  136 mEq/L (136-145)   01/19/17  05:42    


 


Potassium  3.3 mEq/L (3.5-5.1)  L  01/19/17  05:42    


 


Chloride  111 mEq/L ()  H  01/19/17  05:42    


 


Carbon Dioxide  23.0 mEq/L (21.0-31.0)   01/19/17  05:42    


 


Anion Gap  5.3  (7.0-16.0)  L  01/19/17  05:42    


 


BUN  8 mg/dL (7-25)   01/19/17  05:42    


 


Creatinine  0.8 mg/dL (0.7-1.3)   01/19/17  05:42    


 


Est GFR ( Amer)  > 60.0 ml/min  01/19/17  05:42    


 


Est GFR (Non-Af Amer)  > 60.0 ml/min  01/19/17  05:42    


 


BUN/Creatinine Ratio  10.0   01/19/17  05:42    


 


Glucose  91 mg/dL ()   01/19/17  05:42    


 


Whole Bld Lactic Acid  1.06 mmol/L (0.60-2.00)   01/17/17  13:05    


 


Calcium  8.9 mg/dL (8.6-10.3)   01/19/17  05:42    


 


Total Bilirubin  0.4 mg/dL (0.3-1.0)   01/19/17  05:42    


 


AST  10 U/L (13-39)  L  01/19/17  05:42    


 


ALT  10 U/L (7-52)   01/19/17  05:42    


 


Alkaline Phosphatase  107 U/L ()  H  01/19/17  05:42    


 


Troponin I  < 0.01 ng/mL (0.01-0.05)  L  01/16/17  14:20    


 


Total Protein  6.2 gm/dL (6.0-8.3)   01/19/17  05:42    


 


Albumin  3.4 gm/dL (4.2-5.5)  L  01/19/17  05:42    


 


Globulin  2.8 gm/dL  01/19/17  05:42    


 


Albumin/Globulin Ratio  1.2  (1.0-1.8)   01/19/17  05:42    


 


Amylase  38 U/L ()   01/16/17  14:20    


 


Lipase  18 U/L (11-82)   01/16/17  14:20    


 


Urine Source  RANDOM   01/16/17  15:55    


 


Urine Color  YELLOW   01/16/17  15:55    


 


Urine Clarity  CLEAR  (CLEAR)   01/16/17  15:55    


 


Urine pH  5.5   01/16/17  15:55    


 


Ur Specific Gravity  1.030  (1.005-1.030)   01/16/17  15:55    


 


Urine Protein  100 mg/dL (NEGATIVE)  H  01/16/17  15:55    


 


Urine Glucose (UA)  NEGATIVE mg/dL (NEGATIVE)   01/16/17  15:55    


 


Urine Ketones  15 mg/dL (NEGATIVE)  H  01/16/17  15:55    


 


Urine Blood  MODERATE  (NEGATIVE)  H  01/16/17  15:55    


 


Urine Nitrate  NEGATIVE  (NEGATIVE)   01/16/17  15:55    


 


Urine Bilirubin  SMALL  (NEGATIVE)  H  01/16/17  15:55    


 


Urine Urobilinogen  0.2 E.U./dL (0.2 - 1.0)   01/16/17  15:55    


 


Ur Leukocyte Esterase  NEGATIVE  (NEGATIVE)   01/16/17  15:55    


 


Urine RBC  10-25 /hpf (0-5)  H  01/16/17  15:55    


 


Urine WBC  2-5 /hpf (0-5)  H  01/16/17  15:55    


 


Ur Epithelial Cells  NONE SEEN /lpf (FEW)   01/16/17  15:55    


 


Urine Bacteria  NONE SEEN /hpf (NONE SEEN)   01/16/17  15:55    














- Physical Exam


Vitals and I&O: 


 Vital Signs











Temp  97.9 F   01/19/17 00:00


 


Pulse  80   01/19/17 04:09


 


Resp  18   01/19/17 04:09


 


BP  114/64   01/19/17 00:00


 


Pulse Ox  95   01/19/17 04:09








 Intake & Output











 01/18/17 01/19/17 01/19/17





 18:59 06:59 18:59


 


Intake Total 100 100 


 


Output Total  300 300


 


Balance 100 -200 -300


 


Intake:   


 


  Intake, IV Amount 100 100 


 


    metroNIDAZOLE 500mg/ 100 





    100mL 500 mg In 100 ml @   





    100 mls/hr IV Q8HR [facility] Rx   





    #:[id number]   


 


Output:   


 


  Urine  200 300


 


  Stool  100 


 


Other:   


 


  # Voids  1 


 


  # Bowel Movements  1 


 


  Stool Characteristics Formed Mucoid 





 Liquid Black 





 Green Green 











Active Medications: 


 Current Medications





Albuterol Sulfate (Albuterol 2.5mg/3ml Neb Ud)  2.5 mg HHN Q8HR PRN


   PRN Reason: sob


   Stop: 03/17/17 21:50


   Last Admin: 01/19/17 04:08 Dose:  2.5 mg


Amlodipine Besylate (Norvasc)  10 mg PO DAILY [facility]


   Stop: 03/18/17 08:59


   Last Admin: 01/18/17 08:09 Dose:  10 mg


Hydromorphone HCl (Dilaudid)  1 mg IVP Q6HR PRN


   PRN Reason: Pain (Moderate)


   Stop: 03/17/17 21:30


   Last Admin: 01/19/17 04:20 Dose:  1 mg


Sodium Chloride (Nacl 0.9%)  1,000 mls @ 75 mls/hr IV .[id number] [facility]


   Stop: 03/17/17 21:29


   Last Admin: 01/18/17 20:56 Dose:  75 mls/hr


Levofloxacin (Levaquin Pb)  500 mg in 100 mls @ 100 mls/hr IV Q24HR [facility]


   Stop: 03/17/17 21:29


   Last Admin: 01/18/17 23:55 Dose:  100 mls/hr


Metronidazole (Flagyl)  500 mg in 100 mls @ 100 mls/hr IV Q8HR [facility]


   Stop: 03/18/17 04:59


   Last Admin: 01/19/17 04:11 Dose:  100 mls/hr


Propranolol HCl (Inderal)  40 mg PO BID [facility]


   Stop: 03/19/17 08:59


   Last Admin: 01/18/17 16:07 Dose:  40 mg








General: Alert, Oriented x3, Cooperative, No acute distress


HEENT: Atraumatic


Neck: Supple


Cardiovascular: Regular rate


Lungs: Clear to auscultation


Abdomen: Bowel sounds, Soft, Other (Pain at palpation)


Extremities: Other (No edema)


Neurological: Normal gait


Skin: Other (Warm and dry)


Psych/Mental Status: Mental status NL





- Procedures


Procedures: 


 Procedures











Procedure Code Date


 


INJECT/INFUSE NEC 99.29 03/30/15


 


VENOUS PUNCTURE NEC 38.99 10/18/14














Assessment/Plan





- Problem List


Patient Problems: 


All Active Problems





GENERALIZED ABDOMINAL PAIN (Acute) 


Abdominal pain (Active) R10.9


Kidney stone (Active 07/13/13) N20.0


Abdominal pain in male (Acute) R10.9


Dyspnea (Acute) R06.00


Flank pain (Acute 04/18/15) R10.9


Flank pain, chronic (Acute) R10.9


Left flank pain (Acute) R10.9


Lower abdominal pain (Acute) R10.30











- Assessment


Assessment: 


Pain is awake, alert, calm.  Dx: Abdominal pain possible secondary to colitis, 

UTI, HTN, COPD.  Colonoscopy shows colitis.  Patient continue refering 

abdominal pain and requesting more and more pain medication.  I belive pain is 

not that hard as patient is telling us.  I belive patient is seeking drugs.  





- Plan


Plan: 


 Case will be discuss with GI and Patient will be discharge.

## 2017-01-20 RX ADMIN — ALBUTEROL SULFATE SCH: 2.5 SOLUTION RESPIRATORY (INHALATION) at 11:59

## 2017-01-20 RX ADMIN — HYDROMORPHONE HYDROCHLORIDE PRN MG: 1 INJECTION, SOLUTION INTRAMUSCULAR; INTRAVENOUS; SUBCUTANEOUS at 14:52

## 2017-01-20 RX ADMIN — HYDROMORPHONE HYDROCHLORIDE PRN MG: 1 INJECTION, SOLUTION INTRAMUSCULAR; INTRAVENOUS; SUBCUTANEOUS at 21:22

## 2017-01-20 RX ADMIN — METRONIDAZOLE SCH MLS/HR: 500 INJECTION, SOLUTION INTRAVENOUS at 21:23

## 2017-01-20 RX ADMIN — IPRATROPIUM BROMIDE SCH MG: 0.5 SOLUTION RESPIRATORY (INHALATION) at 15:42

## 2017-01-20 RX ADMIN — IPRATROPIUM BROMIDE SCH MG: 0.5 SOLUTION RESPIRATORY (INHALATION) at 19:32

## 2017-01-20 RX ADMIN — IPRATROPIUM BROMIDE SCH MG: 0.5 SOLUTION RESPIRATORY (INHALATION) at 07:32

## 2017-01-20 RX ADMIN — HYDROMORPHONE HYDROCHLORIDE PRN MG: 1 INJECTION, SOLUTION INTRAMUSCULAR; INTRAVENOUS; SUBCUTANEOUS at 03:02

## 2017-01-20 RX ADMIN — IPRATROPIUM BROMIDE SCH: 0.5 SOLUTION RESPIRATORY (INHALATION) at 11:59

## 2017-01-20 RX ADMIN — ALBUTEROL SULFATE SCH MG: 2.5 SOLUTION RESPIRATORY (INHALATION) at 19:32

## 2017-01-20 RX ADMIN — ALBUTEROL SULFATE SCH MG: 2.5 SOLUTION RESPIRATORY (INHALATION) at 15:42

## 2017-01-20 RX ADMIN — LEVOFLOXACIN SCH MLS/HR: 5 INJECTION INTRAVENOUS at 22:40

## 2017-01-20 RX ADMIN — HYDROMORPHONE HYDROCHLORIDE PRN MG: 1 INJECTION, SOLUTION INTRAMUSCULAR; INTRAVENOUS; SUBCUTANEOUS at 08:36

## 2017-01-20 RX ADMIN — METRONIDAZOLE SCH MLS/HR: 500 INJECTION, SOLUTION INTRAVENOUS at 06:20

## 2017-01-20 RX ADMIN — ALBUTEROL SULFATE SCH MG: 2.5 SOLUTION RESPIRATORY (INHALATION) at 07:32

## 2017-01-20 RX ADMIN — CASTOR OIL AND BALSAM, PERU SCH APPL: 788; 87 OINTMENT TOPICAL at 09:00

## 2017-01-20 RX ADMIN — METRONIDAZOLE SCH MLS/HR: 500 INJECTION, SOLUTION INTRAVENOUS at 13:33

## 2017-01-20 NOTE — DIAGNOSTIC IMAGING REPORT
Radionuclide biliary scan (HIDA scan)



HISTORY: Cholelithiasis, pain



5.2 mCi technetium labeled biliary age and was used in the exam.



There is normal hepatic uptake and clearance. There is normal excretion

of nuclide into the gallbladder, common bile duct, and small bowel.



IMPRESSION: Negative examination

## 2017-01-20 NOTE — GENERAL PROGRESS NOTE
Subjective





- Review of Systems


Service Date: 01/20/17


Subjective: 


I want more pain medication





Objective





- Results


Result Diagrams: 


 01/19/17 05:42





 01/19/17 05:42


Recent Labs: 


 Laboratory Last Values











WBC  4.1 Th/cmm (4.8-10.8)  L D 01/19/17  05:42    


 


RBC  4.01 Mil/cmm (4.30-5.70)  L  01/19/17  05:42    


 


Hgb  13.0 gm/dL (13.2-17.3)  L  01/19/17  05:42    


 


Hct  38.1 % (39.0-49.0)  L  01/19/17  05:42    


 


MCV  95.0 fl (80-99)   01/19/17  05:42    


 


MCH  32.3 pg (26.0-30.0)  H  01/19/17  05:42    


 


MCHC Differential  34.0 pg (28.0-36.0)   01/19/17  05:42    


 


RDW  12.3 % (11.5-20.0)   01/19/17  05:42    


 


Plt Count  289 Th/cmm (150-400)   01/19/17  05:42    


 


MPV  6.8 fl  01/19/17  05:42    


 


Neutrophils %  50.3 % (40.0-80.0)   01/19/17  05:42    


 


Lymphocytes %  30.2 % (20.0-50.0)   01/19/17  05:42    


 


Monocytes %  11.4 % (2.0-10.0)  H  01/19/17  05:42    


 


Eosinophils %  7.6 % (0.0-5.0)  H  01/19/17  05:42    


 


Basophils %  0.5 % (0.0-2.0)   01/19/17  05:42    


 


PT  10.5 SECONDS (9.5-11.5)   01/18/17  05:31    


 


INR  1.06  (0.5-1.4)   01/18/17  05:31    


 


Sodium  136 mEq/L (136-145)   01/19/17  05:42    


 


Potassium  3.3 mEq/L (3.5-5.1)  L  01/19/17  05:42    


 


Chloride  111 mEq/L ()  H  01/19/17  05:42    


 


Carbon Dioxide  23.0 mEq/L (21.0-31.0)   01/19/17  05:42    


 


Anion Gap  5.3  (7.0-16.0)  L  01/19/17  05:42    


 


BUN  8 mg/dL (7-25)   01/19/17  05:42    


 


Creatinine  0.8 mg/dL (0.7-1.3)   01/19/17  05:42    


 


Est GFR ( Amer)  > 60.0 ml/min  01/19/17  05:42    


 


Est GFR (Non-Af Amer)  > 60.0 ml/min  01/19/17  05:42    


 


BUN/Creatinine Ratio  10.0   01/19/17  05:42    


 


Glucose  91 mg/dL ()   01/19/17  05:42    


 


Whole Bld Lactic Acid  1.06 mmol/L (0.60-2.00)   01/17/17  13:05    


 


Calcium  8.9 mg/dL (8.6-10.3)   01/19/17  05:42    


 


Total Bilirubin  0.4 mg/dL (0.3-1.0)   01/19/17  05:42    


 


AST  10 U/L (13-39)  L  01/19/17  05:42    


 


ALT  10 U/L (7-52)   01/19/17  05:42    


 


Alkaline Phosphatase  107 U/L ()  H  01/19/17  05:42    


 


Troponin I  < 0.01 ng/mL (0.01-0.05)  L  01/16/17  14:20    


 


Total Protein  6.2 gm/dL (6.0-8.3)   01/19/17  05:42    


 


Albumin  3.4 gm/dL (4.2-5.5)  L  01/19/17  05:42    


 


Globulin  2.8 gm/dL  01/19/17  05:42    


 


Albumin/Globulin Ratio  1.2  (1.0-1.8)   01/19/17  05:42    


 


Amylase  38 U/L ()   01/16/17  14:20    


 


Lipase  18 U/L (11-82)   01/16/17  14:20    


 


Urine Source  RANDOM   01/16/17  15:55    


 


Urine Color  YELLOW   01/16/17  15:55    


 


Urine Clarity  CLEAR  (CLEAR)   01/16/17  15:55    


 


Urine pH  5.5   01/16/17  15:55    


 


Ur Specific Gravity  1.030  (1.005-1.030)   01/16/17  15:55    


 


Urine Protein  100 mg/dL (NEGATIVE)  H  01/16/17  15:55    


 


Urine Glucose (UA)  NEGATIVE mg/dL (NEGATIVE)   01/16/17  15:55    


 


Urine Ketones  15 mg/dL (NEGATIVE)  H  01/16/17  15:55    


 


Urine Blood  MODERATE  (NEGATIVE)  H  01/16/17  15:55    


 


Urine Nitrate  NEGATIVE  (NEGATIVE)   01/16/17  15:55    


 


Urine Bilirubin  SMALL  (NEGATIVE)  H  01/16/17  15:55    


 


Urine Urobilinogen  0.2 E.U./dL (0.2 - 1.0)   01/16/17  15:55    


 


Ur Leukocyte Esterase  NEGATIVE  (NEGATIVE)   01/16/17  15:55    


 


Urine RBC  10-25 /hpf (0-5)  H  01/16/17  15:55    


 


Urine WBC  2-5 /hpf (0-5)  H  01/16/17  15:55    


 


Ur Epithelial Cells  NONE SEEN /lpf (FEW)   01/16/17  15:55    


 


Urine Bacteria  NONE SEEN /hpf (NONE SEEN)   01/16/17  15:55    














- Physical Exam


Vitals and I&O: 


 Vital Signs











Temp  97.9 F   01/20/17 00:00


 


Pulse  80   01/20/17 11:59


 


Resp  18   01/20/17 11:59


 


BP  146/90   01/20/17 08:39


 


Pulse Ox  95   01/20/17 11:59








 Intake & Output











 01/19/17 01/20/17 01/20/17





 18:59 06:59 18:59


 


Intake Total 1100 400 


 


Output Total 300  


 


Balance 800 400 


 


Intake:   


 


  Intake, IV Amount 1100 100 


 


    Sodium Chloride 0.9% 1, 1000  





    000 ml @ 75 mls/hr IV .   





    [id number] [facility] Rx#:[id number]   


 


    metroNIDAZOLE 500mg/ 100 





    100mL 500 mg In 100 ml @   





    100 mls/hr IV Q8HR [facility] Rx   





    #:[id number]   


 


  Oral  300 


 


Output:   


 


  Urine 300  


 


Other:   


 


  # Voids  2 


 


  # Bowel Movements  3 


 


  Stool Characteristics  Mucoid 





  Black 





  Green 











Active Medications: 


 Current Medications





Albuterol Sulfate (Albuterol 2.5mg/3ml Neb Ud)  2.5 mg HHN QIDRT [facility]


   Stop: 03/21/17 06:59


   Last Admin: 01/20/17 11:59 Dose:  Not Given


Albuterol Sulfate (Albuterol 2.5mg/3ml Neb Ud)  2.5 mg HHN Q8HR PRN


   PRN Reason: Shortness of Breath


   Stop: 03/21/17 02:59


Amlodipine Besylate (Norvasc)  10 mg PO DAILY [facility]


   Stop: 03/18/17 08:59


   Last Admin: 01/20/17 08:39 Dose:  10 mg


Hydromorphone HCl (Dilaudid)  1 mg IVP Q6HR PRN


   PRN Reason: Pain (Moderate)


   Stop: 03/17/17 21:30


   Last Admin: 01/20/17 08:36 Dose:  1 mg


Sodium Chloride (Nacl 0.9%)  1,000 mls @ 75 mls/hr IV .[id number] [facility]


   Stop: 03/17/17 21:29


   Last Admin: 01/19/17 18:00 Dose:  75 mls/hr


Levofloxacin (Levaquin Pb)  500 mg in 100 mls @ 100 mls/hr IV Q24HR [facility]


   Stop: 03/17/17 21:29


   Last Admin: 01/19/17 22:02 Dose:  100 mls/hr


Metronidazole (Flagyl)  500 mg in 100 mls @ 100 mls/hr IV Q8HR [facility]


   Stop: 03/18/17 04:59


   Last Admin: 01/20/17 06:20 Dose:  100 mls/hr


Ipratropium Bromide (Atrovent Neb 0.5mg/2.5ml)  0.5 mg HHN QIDRT [facility]


   Stop: 03/21/17 06:59


   Last Admin: 01/20/17 11:59 Dose:  Not Given


Propranolol HCl (Inderal)  40 mg PO BID [facility]


   Stop: 03/19/17 08:59


   Last Admin: 01/20/17 08:38 Dose:  40 mg








General: Alert, Oriented x3, Cooperative


HEENT: Atraumatic


Neck: Supple


Cardiovascular: Regular rate


Lungs: Clear to auscultation


Abdomen: Bowel sounds, Soft


Extremities: Other (No edema)


Neurological: Normal gait


Skin: Other (Warm and dry)


Psych/Mental Status: Mental status NL





- Procedures


Procedures: 


 Procedures











Procedure Code Date


 


INJECT/INFUSE NEC 99.29 03/30/15


 


VENOUS PUNCTURE NEC 38.99 10/18/14














Assessment/Plan





- Problem List


Patient Problems: 


All Active Problems





GENERALIZED ABDOMINAL PAIN (Acute) 


Abdominal pain (Active) R10.9


Kidney stone (Active 07/13/13) N20.0


Abdominal pain in male (Acute) R10.9


Dyspnea (Acute) R06.00


Flank pain (Acute 04/18/15) R10.9


Flank pain, chronic (Acute) R10.9


Left flank pain (Acute) R10.9


Lower abdominal pain (Acute) R10.30











- Assessment


Assessment: 


Pain is awake, alert, calm.  Dx: Abdominal pain possible secondary to colitis, 

UTI, HTN, COPD.  Biopsy shows Pseudomenbranose colitis.  Patient continue 

refering abdominal pain and requesting more and more pain medication.  I belive 

pain is not that hard as patient is telling us.  Case discussed with GI and 

more test will be done





- Plan


Plan: 


 Will continue to monitor

## 2017-01-21 LAB
ALBUMIN/GLOB SERPL: 1.3 {RATIO} (ref 1–1.8)
ALP SERPL-CCNC: 104 U/L (ref 34–104)
ALT SERPL-CCNC: 8 U/L (ref 7–52)
ANION GAP SERPL CALC-SCNC: 6.8 MMOL/L (ref 7–16)
AST SERPL-CCNC: 13 U/L (ref 13–39)
BASOPHILS NFR BLD AUTO: 0.7 % (ref 0–2)
BILIRUB SERPL-MCNC: 0.4 MG/DL (ref 0.3–1)
BUN SERPL-MCNC: 8 MG/DL (ref 7–25)
BUN/CREAT SERPL: 11.4
CALCIUM SERPL-MCNC: 9.3 MG/DL (ref 8.6–10.3)
CHLORIDE SERPL-SCNC: 109 MEQ/L (ref 98–107)
CO2 SERPL-SCNC: 24.9 MEQ/L (ref 21–31)
CREAT SERPL-MCNC: 0.7 MG/DL (ref 0.7–1.3)
EOSINOPHIL NFR BLD AUTO: 7.7 % (ref 0–5)
ERYTHROCYTE [DISTWIDTH] IN BLOOD BY AUTOMATED COUNT: 12.7 % (ref 11.5–20)
GLOBULIN SER-MCNC: 2.8 GM/DL
GLUCOSE SERPL-MCNC: 91 MG/DL (ref 70–105)
HCT VFR BLD CALC: 41.4 % (ref 39–49)
HGB BLD-MCNC: 13.9 GM/DL (ref 13.2–17.3)
LYMPHOCYTES NFR BLD AUTO: 33.9 % (ref 20–50)
MCH RBC QN AUTO: 31.5 PG (ref 26–30)
MCHC RBC AUTO-ENTMCNC: 33.5 PG (ref 28–36)
MCV RBC AUTO: 94.1 FL (ref 80–99)
MONOCYTES NFR BLD AUTO: 11.7 % (ref 2–10)
NEUTROPHILS # BLD: 1.8 TH/CMM (ref 1.8–8)
NEUTROPHILS NFR BLD AUTO: 46 % (ref 40–80)
PLATELET # BLD: 265 TH/CMM (ref 150–400)
PMV BLD AUTO: 6.8 FL
POTASSIUM SERPL-SCNC: 3.7 MEQ/L (ref 3.5–5.1)
RBC # BLD AUTO: 4.41 MIL/CMM (ref 4.3–5.7)
SODIUM SERPL-SCNC: 137 MEQ/L (ref 136–145)
WBC # BLD AUTO: 4 TH/CMM (ref 4.8–10.8)

## 2017-01-21 RX ADMIN — ALBUTEROL SULFATE SCH MG: 2.5 SOLUTION RESPIRATORY (INHALATION) at 07:06

## 2017-01-21 RX ADMIN — IPRATROPIUM BROMIDE SCH MG: 0.5 SOLUTION RESPIRATORY (INHALATION) at 07:06

## 2017-01-21 RX ADMIN — METRONIDAZOLE SCH MLS/HR: 500 INJECTION, SOLUTION INTRAVENOUS at 05:34

## 2017-01-21 RX ADMIN — IPRATROPIUM BROMIDE SCH MG: 0.5 SOLUTION RESPIRATORY (INHALATION) at 11:18

## 2017-01-21 RX ADMIN — METRONIDAZOLE SCH MLS/HR: 500 INJECTION, SOLUTION INTRAVENOUS at 12:15

## 2017-01-21 RX ADMIN — ALBUTEROL SULFATE SCH MG: 2.5 SOLUTION RESPIRATORY (INHALATION) at 11:18

## 2017-01-21 RX ADMIN — IPRATROPIUM BROMIDE SCH MG: 0.5 SOLUTION RESPIRATORY (INHALATION) at 15:05

## 2017-01-21 RX ADMIN — ALBUTEROL SULFATE SCH MG: 2.5 SOLUTION RESPIRATORY (INHALATION) at 15:05

## 2017-01-21 RX ADMIN — HYDROMORPHONE HYDROCHLORIDE PRN MG: 1 INJECTION, SOLUTION INTRAMUSCULAR; INTRAVENOUS; SUBCUTANEOUS at 03:55

## 2017-01-21 RX ADMIN — HYDROMORPHONE HYDROCHLORIDE PRN MG: 1 INJECTION, SOLUTION INTRAMUSCULAR; INTRAVENOUS; SUBCUTANEOUS at 09:43

## 2017-01-21 RX ADMIN — HYDROMORPHONE HYDROCHLORIDE PRN MG: 1 INJECTION, SOLUTION INTRAMUSCULAR; INTRAVENOUS; SUBCUTANEOUS at 15:11

## 2017-01-21 RX ADMIN — CASTOR OIL AND BALSAM, PERU SCH APPL: 788; 87 OINTMENT TOPICAL at 09:48

## 2017-02-03 NOTE — DISCHARGE SUMMARY
HISTORY OF PRESENT ILLNESS:  This is the case of a white male who came to ER

secondary to abdominal pain.  Pain was 8-10 and reason why patient was admitted

for evaluation and treatment.



HOSPITAL COURSE AND TREATMENT:  This patient was admitted in the medical

surgical floor.  Abdominal CT was done and shows cholelithiasis and colitis,

hepatomegaly and HIDA scan was negative.  The patient was started on IV normal

saline, Levaquin, metronidazole and Dilaudid for pain control, but this

treatment and after few days in the hospital case was discussed with and Dr. Scott AVILES, a decision was made, the patient already received the maximum benefit of

hospitalization and he could be sent home to continue treatment with primary

care physician.  At the moment of the discharge, the patient was awake, alert,

in no acute distress.



CONSULTANT:  In this case, Dr. Scott AVILES.



DISPOSITION:  The patient is sent home.



IMPRESSION:

1. Cholelithiasis.

2. Colitis.

3. Hepatomegaly.





DD: 02/03/2017 13:45

DT: 02/03/2017 20:41

JOB# [id number]  [id number]

## 2017-02-07 ENCOUNTER — HOSPITAL ENCOUNTER (EMERGENCY)
Dept: HOSPITAL 36 - ER | Age: 57
Discharge: HOME | End: 2017-02-07
Payer: MEDICARE

## 2017-02-07 DIAGNOSIS — M54.5: ICD-10-CM
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
## 2017-02-07 DIAGNOSIS — Z88.5: ICD-10-CM

## 2017-02-07 DIAGNOSIS — M54.2: Primary | ICD-10-CM

## 2017-02-07 DIAGNOSIS — F17.200: ICD-10-CM
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
## 2017-02-07 DIAGNOSIS — Z88.6: ICD-10-CM

## 2017-02-07 PROCEDURE — Z7502: HCPCS
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
## 2017-02-18 ENCOUNTER — HOSPITAL ENCOUNTER (EMERGENCY)
Dept: HOSPITAL 36 - ER | Age: 57
Discharge: LEFT BEFORE DISCHARGE | End: 2017-02-18
Payer: MEDICARE

## 2017-02-18 DIAGNOSIS — I10: ICD-10-CM

## 2017-02-18 DIAGNOSIS — R10.9: Primary | ICD-10-CM

## 2017-02-18 DIAGNOSIS — Z88.8: ICD-10-CM

## 2017-02-18 DIAGNOSIS — Z88.6: ICD-10-CM

## 2017-02-18 PROCEDURE — Z7502: HCPCS
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
## 2017-04-01 ENCOUNTER — HOSPITAL ENCOUNTER (EMERGENCY)
Dept: HOSPITAL 36 - ER | Age: 57
Discharge: HOME | End: 2017-04-01
Payer: MEDICARE

## 2017-04-01 ENCOUNTER — HOSPITAL ENCOUNTER (EMERGENCY)
Dept: HOSPITAL 4 - SED | Age: 57
Discharge: HOME | End: 2017-04-01
Payer: MEDICARE

## 2017-04-01 VITALS — WEIGHT: 150 LBS | HEIGHT: 72 IN | BODY MASS INDEX: 20.32 KG/M2

## 2017-04-01 VITALS — SYSTOLIC BLOOD PRESSURE: 147 MMHG

## 2017-04-01 VITALS — SYSTOLIC BLOOD PRESSURE: 126 MMHG

## 2017-04-01 DIAGNOSIS — Z90.6: ICD-10-CM

## 2017-04-01 DIAGNOSIS — G89.18: Primary | ICD-10-CM

## 2017-04-01 DIAGNOSIS — R10.11: Primary | ICD-10-CM

## 2017-04-01 DIAGNOSIS — Z88.6: ICD-10-CM

## 2017-04-01 DIAGNOSIS — J45.909: ICD-10-CM

## 2017-04-01 DIAGNOSIS — J44.9: ICD-10-CM

## 2017-04-01 DIAGNOSIS — R10.11: ICD-10-CM

## 2017-04-01 DIAGNOSIS — R10.12: ICD-10-CM

## 2017-04-01 DIAGNOSIS — Z88.5: ICD-10-CM

## 2017-04-01 DIAGNOSIS — Z87.442: ICD-10-CM

## 2017-04-01 DIAGNOSIS — I10: ICD-10-CM

## 2017-04-01 LAB
ALBUMIN SERPL BCP-MCNC: 4.1 G/DL (ref 3.4–4.8)
ALBUMIN/GLOB SERPL: 1.6 {RATIO} (ref 1–1.8)
ALP SERPL-CCNC: 170 U/L (ref 34–104)
ALT SERPL W P-5'-P-CCNC: 46 U/L (ref 12–78)
ALT SERPL-CCNC: 37 U/L (ref 7–52)
AMYLASE SERPL-CCNC: 27 U/L (ref 29–103)
AMYLASE SERPL-CCNC: 36 U/L (ref 0–100)
ANION GAP SERPL CALC-SCNC: 10.9 MMOL/L (ref 7–16)
ANION GAP SERPL CALCULATED.3IONS-SCNC: 11 MMOL/L (ref 5–15)
APPEARANCE UR: CLEAR
AST SERPL W P-5'-P-CCNC: 21 U/L (ref 10–37)
AST SERPL-CCNC: 59 U/L (ref 13–39)
BACTERIA #/AREA URNS HPF: (no result) /HPF
BACTERIA URNS QL MICRO: (no result) /HPF
BASOPHILS # BLD AUTO: 0 K/UL (ref 0–0.2)
BASOPHILS NFR BLD AUTO: 0.4 % (ref 0–2)
BASOPHILS NFR BLD AUTO: 0.4 % (ref 0–2)
BILIRUB SERPL-MCNC: 0.5 MG/DL (ref 0–1)
BILIRUB SERPL-MCNC: 0.6 MG/DL (ref 0.3–1)
BILIRUB UR QL STRIP: (no result)
BILIRUB UR-MCNC: NEGATIVE MG/DL
BUN SERPL-MCNC: 7 MG/DL (ref 8–21)
BUN SERPL-MCNC: 8 MG/DL (ref 7–25)
BUN/CREAT SERPL: 10
CALCIUM SERPL-MCNC: 8.9 MG/DL (ref 8.4–11)
CALCIUM SERPL-MCNC: 9.3 MG/DL (ref 8.6–10.3)
CHLORIDE SERPL-SCNC: 104 MMOL/L (ref 98–107)
CHLORIDE SERPL-SCNC: 107 MEQ/L (ref 98–107)
CO2 SERPL-SCNC: 22.5 MEQ/L (ref 21–31)
COLOR UR: YELLOW
COLOR UR: YELLOW
CREAT SERPL-MCNC: 0.8 MG/DL (ref 0.7–1.3)
CREAT SERPL-MCNC: 0.93 MG/DL (ref 0.55–1.3)
EOSINOPHIL # BLD AUTO: 0 K/UL (ref 0–0.4)
EOSINOPHIL NFR BLD AUTO: 0.8 % (ref 0–5)
EOSINOPHIL NFR BLD AUTO: 1 % (ref 0–4)
EPI CELLS URNS QL MICRO: (no result) /LPF
ERYTHROCYTE [DISTWIDTH] IN BLOOD BY AUTOMATED COUNT: 12.9 % (ref 11.5–20)
ERYTHROCYTE [DISTWIDTH] IN BLOOD BY AUTOMATED COUNT: 13.1 % (ref 9–15)
GFR SERPL CREATININE-BSD FRML MDRD: 108 ML/MIN (ref 90–?)
GLOBULIN SER-MCNC: 2.6 GM/DL
GLUCOSE SERPL-MCNC: 88 MG/DL (ref 70–105)
GLUCOSE SERPL-MCNC: 99 MG/DL (ref 70–99)
GLUCOSE UR STRIP-MCNC: NEGATIVE MG/DL
GLUCOSE UR STRIP-MCNC: NEGATIVE MG/DL
HCT VFR BLD AUTO: 39.2 % (ref 36–54)
HCT VFR BLD CALC: 37.5 % (ref 39–49)
HGB BLD-MCNC: 12.6 GM/DL (ref 13.2–17.3)
HGB BLD-MCNC: 13.3 G/DL (ref 14–18)
HGB UR QL STRIP: (no result)
KETONES UR STRIP-MCNC: (no result) MG/DL
KETONES UR STRIP-MCNC: >=80 MG/DL
LEUKOCYTE ESTERASE UR QL STRIP: NEGATIVE
LIPASE SERPL-CCNC: 16 U/L (ref 11–82)
LIPASE SERPL-CCNC: 83 U/L (ref 73–393)
LYMPHOCYTES # BLD AUTO: 0.8 K/UL (ref 1–5.5)
LYMPHOCYTES NFR BLD AUTO: 18.8 % (ref 20–50)
LYMPHOCYTES NFR BLD AUTO: 24.9 % (ref 20.5–51.5)
MCH RBC QN AUTO: 31.4 PG (ref 26–30)
MCH RBC QN AUTO: 32 PG (ref 27–31)
MCHC RBC AUTO-ENTMCNC: 33.6 PG (ref 28–36)
MCHC RBC AUTO-ENTMCNC: 34 % (ref 32–36)
MCV RBC AUTO: 93 FL (ref 79–98)
MCV RBC AUTO: 93.6 FL (ref 80–99)
MONOCYTES # BLD MANUAL: 0.4 K/UL (ref 0–1)
MONOCYTES # BLD MANUAL: 12.2 % (ref 1.7–9.3)
MONOCYTES NFR BLD AUTO: 11.4 % (ref 2–10)
MUCOUS THREADS URNS QL MICRO: (no result) /LPF
NEUTROPHILS # BLD AUTO: 2.1 K/UL (ref 1.8–7.7)
NEUTROPHILS # BLD: 2.8 TH/CMM (ref 1.8–8)
NEUTROPHILS NFR BLD AUTO: 61.5 % (ref 40–70)
NEUTROPHILS NFR BLD AUTO: 68.6 % (ref 40–80)
NITRITE UR QL STRIP: NEGATIVE
PH UR STRIP: 6 [PH] (ref 5–8)
PH UR STRIP: 6.5 [PH]
PLATELET # BLD AUTO: 274 K/UL (ref 130–430)
PLATELET # BLD: 239 TH/CMM (ref 150–400)
PMV BLD AUTO: 7 FL
POTASSIUM SERPL-SCNC: 3.3 MMOL/L (ref 3.5–5.1)
POTASSIUM SERPL-SCNC: 3.4 MEQ/L (ref 3.5–5.1)
PROT SERPL-MCNC: 7.1 G/DL (ref 6.4–8.3)
PROT UR QL STRIP: NEGATIVE
PROT UR STRIP-MCNC: NEGATIVE MG/DL
RBC # BLD AUTO: 4 MIL/CMM (ref 4.3–5.7)
RBC # BLD AUTO: 4.21 MIL/UL (ref 4.2–6.2)
RBC # UR STRIP: NEGATIVE /UL
RBC #/AREA URNS HPF: (no result) /HPF (ref 0–3)
RBC #/AREA URNS HPF: (no result) /HPF (ref 0–5)
SODIUM SERPL-SCNC: 137 MEQ/L (ref 136–145)
SODIUM SERPLBLD-SCNC: 139 MMOL/L (ref 136–145)
SP GR UR STRIP: 1.02 (ref 1–1.03)
UROBILINOGEN UR STRIP-ACNC: 0.2 E.U./DL (ref 0.2–1)
UROBILINOGEN UR STRIP-MCNC: 0.2 MG/DL (ref 0.2–1)
WBC # BLD AUTO: 3.3 K/UL (ref 4.8–10.8)
WBC # BLD AUTO: 3.9 TH/CMM (ref 4.8–10.8)
WBC #/AREA URNS HPF: (no result) /HPF (ref 0–3)
WBC #/AREA URNS HPF: (no result) /HPF (ref 0–5)

## 2017-04-01 RX ADMIN — HYDROMORPHONE HYDROCHLORIDE STA MG: 1 INJECTION, SOLUTION INTRAMUSCULAR; INTRAVENOUS; SUBCUTANEOUS at 13:12

## 2017-04-01 RX ADMIN — HYDROMORPHONE HYDROCHLORIDE STA MG: 2 INJECTION INTRAMUSCULAR; INTRAVENOUS; SUBCUTANEOUS at 14:15

## 2017-04-01 NOTE — NUR
Patient given written and verbal discharge instructions and verbalizes 
understanding.  ER MD discussed with patient the results and treatment 
provided. Given copies of tests performed in ER. Patient in stable condition. 
ID arm band removed. Patient educated on pain management and to follow up with 
PMD. Pain Scale 3/10.

Opportunity for questions provided and answered.
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
## 2017-04-02 NOTE — DIAGNOSTIC IMAGING REPORT
CT scan abdomen and pelvis without intravenous contrast



HISTORY: Pain, vomiting



Total DLP equals 357



CTDI equals 7.9



Axial sections were obtained from the xiphoid process down to the pubic

symphysis.



The exam is compared with the prior study of January 16, 2017.



The liver exhibits a homogeneous parenchyma. No focal lesions. Surgical

clips are seen in the jg hepatis region consistent with a prior

cholecystectomy. Spleen appears normal. No focal abnormality seen in the

region of the pancreas. There are 2 5-mm nonobstructing right renal

calculi unchanged from the prior exam. A punctate nonobstructing left

renal calculus is also noted.



The exam of the pelvis demonstrates preservation of normal fat planes.

Mild to moderate enlargement of the prostate gland noted. No other

abnormal masses or abnormal fluid collections. Degenerative changes seen

within the spine.



IMPRESSION

1. Small bilateral nonobstructing renal calculi

2. Findings consistent with a prior cholecystectomy

3. Mild to moderate enlargement of the prostate

4. Degenerative changes within the spine

## 2017-04-04 ENCOUNTER — HOSPITAL ENCOUNTER (EMERGENCY)
Dept: HOSPITAL 36 - ER | Age: 57
Discharge: HOME | End: 2017-04-04
Payer: MEDICARE

## 2017-04-04 DIAGNOSIS — J20.9: Primary | ICD-10-CM

## 2017-04-04 DIAGNOSIS — Z88.6: ICD-10-CM

## 2017-04-04 DIAGNOSIS — G89.4: ICD-10-CM

## 2017-04-04 DIAGNOSIS — F17.200: ICD-10-CM

## 2017-04-04 PROCEDURE — Z7502: HCPCS

## 2017-04-04 NOTE — DIAGNOSTIC IMAGING REPORT
CHEST X-RAY: AP view



INDICATION: Shortness of breath



COMPARISON: Chest x-ray 1/17/2017



FINDINGS: Hyperinflated lungs are seen. No focal consolidation or

effusions. Heart size is normal. Degenerative changes of spine are seen

with scoliosis. Surgical staples are seen along the bilateral axillary

regions and right upper quadrant..



IMPRESSION:



Hyperinflated lungs and possible COPD. No focal consolidation

identified.



Postsurgical changes.
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
## 2017-04-07 ENCOUNTER — HOSPITAL ENCOUNTER (EMERGENCY)
Dept: HOSPITAL 36 - ER | Age: 57
Discharge: HOME | End: 2017-04-07
Payer: MEDICARE

## 2017-04-07 VITALS — DIASTOLIC BLOOD PRESSURE: 100 MMHG | SYSTOLIC BLOOD PRESSURE: 158 MMHG

## 2017-04-07 DIAGNOSIS — I10: ICD-10-CM

## 2017-04-07 DIAGNOSIS — Z88.6: ICD-10-CM

## 2017-04-07 DIAGNOSIS — J44.9: ICD-10-CM

## 2017-04-07 DIAGNOSIS — Z87.891: ICD-10-CM

## 2017-04-07 DIAGNOSIS — Z90.49: ICD-10-CM

## 2017-04-07 DIAGNOSIS — J45.909: ICD-10-CM

## 2017-04-07 DIAGNOSIS — K52.9: Primary | ICD-10-CM

## 2017-04-07 LAB
ALBUMIN/GLOB SERPL: 1.7 {RATIO} (ref 1–1.8)
ALP SERPL-CCNC: 104 U/L (ref 34–104)
ALT SERPL-CCNC: 17 U/L (ref 7–52)
AMYLASE SERPL-CCNC: 31 U/L (ref 29–103)
ANION GAP SERPL CALC-SCNC: 5.7 MMOL/L (ref 7–16)
AST SERPL-CCNC: 12 U/L (ref 13–39)
BACTERIA #/AREA URNS HPF: (no result) /HPF
BASOPHILS NFR BLD AUTO: 0.4 % (ref 0–2)
BILIRUB SERPL-MCNC: 0.2 MG/DL (ref 0.3–1)
BILIRUB UR-MCNC: NEGATIVE MG/DL
BUN SERPL-MCNC: 15 MG/DL (ref 7–25)
BUN/CREAT SERPL: 18.8
CALCIUM SERPL-MCNC: 9 MG/DL (ref 8.6–10.3)
CHLORIDE SERPL-SCNC: 110 MEQ/L (ref 98–107)
CO2 SERPL-SCNC: 26.9 MEQ/L (ref 21–31)
COLOR UR: YELLOW
CREAT SERPL-MCNC: 0.8 MG/DL (ref 0.7–1.3)
EOSINOPHIL NFR BLD AUTO: 0.5 % (ref 0–5)
EPI CELLS URNS QL MICRO: (no result) /LPF
ERYTHROCYTE [DISTWIDTH] IN BLOOD BY AUTOMATED COUNT: 13.8 % (ref 11.5–20)
GLOBULIN SER-MCNC: 2.3 GM/DL
GLUCOSE SERPL-MCNC: 86 MG/DL (ref 70–105)
GLUCOSE UR STRIP-MCNC: NEGATIVE MG/DL
HCT VFR BLD CALC: 33.4 % (ref 39–49)
HGB BLD-MCNC: 11.7 GM/DL (ref 13.2–17.3)
KETONES UR STRIP-MCNC: NEGATIVE MG/DL
LIPASE SERPL-CCNC: 22 U/L (ref 11–82)
LYMPHOCYTES NFR BLD AUTO: 28.2 % (ref 20–50)
MCH RBC QN AUTO: 32.7 PG (ref 26–30)
MCHC RBC AUTO-ENTMCNC: 35.2 PG (ref 28–36)
MCV RBC AUTO: 92.9 FL (ref 80–99)
MONOCYTES NFR BLD AUTO: 8.9 % (ref 2–10)
NEUTROPHILS # BLD: 3.2 TH/CMM (ref 1.8–8)
NEUTROPHILS NFR BLD AUTO: 62 % (ref 40–80)
PH UR STRIP: 7.5 [PH]
PLATELET # BLD: 214 TH/CMM (ref 150–400)
PMV BLD AUTO: 6.7 FL
POTASSIUM SERPL-SCNC: 3.6 MEQ/L (ref 3.5–5.1)
PROT UR STRIP-MCNC: NEGATIVE MG/DL
RBC # BLD AUTO: 3.59 MIL/CMM (ref 4.3–5.7)
RBC # UR STRIP: (no result) /UL
RBC #/AREA URNS HPF: (no result) /HPF (ref 0–5)
SODIUM SERPL-SCNC: 139 MEQ/L (ref 136–145)
UROBILINOGEN UR STRIP-ACNC: 0.2 E.U./DL (ref 0.2–1)
WBC # BLD AUTO: 5.2 TH/CMM (ref 4.8–10.8)
WBC #/AREA URNS HPF: (no result) /HPF (ref 0–5)

## 2017-04-07 PROCEDURE — 36415 COLL VENOUS BLD VENIPUNCTURE: CPT

## 2017-04-07 PROCEDURE — 81001 URINALYSIS AUTO W/SCOPE: CPT

## 2017-04-07 PROCEDURE — 96374 THER/PROPH/DIAG INJ IV PUSH: CPT

## 2017-04-07 PROCEDURE — 85025 COMPLETE CBC W/AUTO DIFF WBC: CPT

## 2017-04-07 PROCEDURE — 99285 EMERGENCY DEPT VISIT HI MDM: CPT

## 2017-04-07 PROCEDURE — 80053 COMPREHEN METABOLIC PANEL: CPT

## 2017-04-07 PROCEDURE — 82150 ASSAY OF AMYLASE: CPT

## 2017-04-07 PROCEDURE — 96361 HYDRATE IV INFUSION ADD-ON: CPT

## 2017-04-07 PROCEDURE — 83690 ASSAY OF LIPASE: CPT

## 2017-04-07 PROCEDURE — 96375 TX/PRO/DX INJ NEW DRUG ADDON: CPT

## 2017-04-07 PROCEDURE — 96372 THER/PROPH/DIAG INJ SC/IM: CPT

## 2017-04-08 ENCOUNTER — HOSPITAL ENCOUNTER (EMERGENCY)
Dept: HOSPITAL 4 - SED | Age: 57
Discharge: LEFT BEFORE DISCHARGE | End: 2017-04-08
Payer: MEDICARE

## 2017-04-08 VITALS — BODY MASS INDEX: 20.32 KG/M2 | WEIGHT: 150 LBS | HEIGHT: 72 IN

## 2017-04-08 VITALS — SYSTOLIC BLOOD PRESSURE: 162 MMHG

## 2017-04-08 DIAGNOSIS — Z53.21: ICD-10-CM

## 2017-04-08 DIAGNOSIS — R30.0: ICD-10-CM

## 2017-04-08 DIAGNOSIS — R10.9: Primary | ICD-10-CM

## 2017-04-08 NOTE — NUR
PT STATED HE DID NOT WANT TO SEE THE MD. PT ENCOURAGED TO STAY AND STRESSED THE 
IMPORTANCE OF FURTHER EVALUATION OF HIS ABD PAIN. PT CONTINUED TO REFUSE 
STATING "I'M GOING TO SEE SOMEONE CLOSER TO MY HOUSE". WRIST BAND REMOVED. 
INFORMED PT TO RETURN AT ANYTIME TO BE EVALUATED.

## 2017-04-08 NOTE — NUR
Patient triaged and placed in waiting room. VSS and patient appears in no acute 
distress at this time. Accompanied by FRIEND, awaiting available bed, and MD 
notified of need for MSE.

## 2017-04-10 ENCOUNTER — HOSPITAL ENCOUNTER (EMERGENCY)
Dept: HOSPITAL 1 - ED | Age: 57
LOS: 1 days | Discharge: LEFT BEFORE DISCHARGE | End: 2017-04-11
Payer: MEDICARE

## 2017-04-10 ENCOUNTER — HOSPITAL ENCOUNTER (EMERGENCY)
Dept: HOSPITAL 36 - ER | Age: 57
Discharge: HOME | End: 2017-04-10
Payer: MEDICARE

## 2017-04-10 VITALS — BODY MASS INDEX: 19.77 KG/M2 | HEIGHT: 72 IN | WEIGHT: 145.99 LBS

## 2017-04-10 DIAGNOSIS — Z88.6: ICD-10-CM

## 2017-04-10 DIAGNOSIS — I10: ICD-10-CM

## 2017-04-10 DIAGNOSIS — F17.200: ICD-10-CM

## 2017-04-10 DIAGNOSIS — R19.7: ICD-10-CM

## 2017-04-10 DIAGNOSIS — Z79.899: ICD-10-CM

## 2017-04-10 DIAGNOSIS — R11.10: ICD-10-CM

## 2017-04-10 DIAGNOSIS — J44.9: ICD-10-CM

## 2017-04-10 DIAGNOSIS — R10.32: Primary | ICD-10-CM

## 2017-04-10 DIAGNOSIS — R10.9: Primary | ICD-10-CM

## 2017-04-10 DIAGNOSIS — Z90.49: ICD-10-CM

## 2017-04-10 DIAGNOSIS — G89.4: ICD-10-CM

## 2017-04-10 DIAGNOSIS — J45.909: ICD-10-CM

## 2017-04-10 DIAGNOSIS — R31.9: ICD-10-CM

## 2017-04-10 DIAGNOSIS — F19.10: ICD-10-CM

## 2017-04-10 LAB
ALBUMIN/GLOB SERPL: 1.7 {RATIO} (ref 1–1.8)
ALP SERPL-CCNC: 123 U/L (ref 34–104)
ALT SERPL-CCNC: 18 U/L (ref 7–52)
AMPHET UR-MCNC: NEGATIVE NG/ML
AMYLASE SERPL-CCNC: 40 U/L (ref 29–103)
ANION GAP SERPL CALC-SCNC: 13.7 MMOL/L (ref 7–16)
AST SERPL-CCNC: 18 U/L (ref 13–39)
BACTERIA #/AREA URNS HPF: (no result) /HPF
BARBITURATES UR-MCNC: NEGATIVE UG/ML
BASOPHILS NFR BLD AUTO: 0.3 % (ref 0–2)
BILIRUB SERPL-MCNC: 0.6 MG/DL (ref 0.3–1)
BILIRUB UR-MCNC: NEGATIVE MG/DL
BUN SERPL-MCNC: 12 MG/DL (ref 7–25)
BUN/CREAT SERPL: 17.1
CALCIUM SERPL-MCNC: 9.3 MG/DL (ref 8.6–10.3)
CHLORIDE SERPL-SCNC: 104 MEQ/L (ref 98–107)
CHOLEST SERPL-MCNC: 197 MG/DL (ref ?–200)
CO2 SERPL-SCNC: 24 MEQ/L (ref 21–31)
COLOR UR: YELLOW
CREAT SERPL-MCNC: 0.7 MG/DL (ref 0.7–1.3)
EOSINOPHIL NFR BLD AUTO: 2.3 % (ref 0–5)
EPI CELLS URNS QL MICRO: (no result) /LPF
ERYTHROCYTE [DISTWIDTH] IN BLOOD BY AUTOMATED COUNT: 13.5 % (ref 11.5–20)
GLOBULIN SER-MCNC: 2.5 GM/DL
GLUCOSE SERPL-MCNC: 99 MG/DL (ref 70–105)
GLUCOSE UR STRIP-MCNC: NEGATIVE MG/DL
HCT VFR BLD CALC: 38.5 % (ref 39–49)
HGB BLD-MCNC: 13.5 GM/DL (ref 13.2–17.3)
INR PPP: 0.98 (ref 0.5–1.4)
KETONES UR STRIP-MCNC: NEGATIVE MG/DL
LYMPHOCYTES NFR BLD AUTO: 26.1 % (ref 20–50)
MCH RBC QN AUTO: 32.5 PG (ref 26–30)
MCHC RBC AUTO-ENTMCNC: 35.1 PG (ref 28–36)
MCV RBC AUTO: 92.5 FL (ref 80–99)
METHADONE UR CFM-MCNC: NEGATIVE NG/ML
MONOCYTES NFR BLD AUTO: 6.2 % (ref 2–10)
NEUTROPHILS # BLD: 4.4 TH/CMM (ref 1.8–8)
NEUTROPHILS NFR BLD AUTO: 65.1 % (ref 40–80)
PH UR STRIP: 7.5 [PH]
PLATELET # BLD: 275 TH/CMM (ref 150–400)
PMV BLD AUTO: 7.2 FL
POTASSIUM SERPL-SCNC: 3.7 MEQ/L (ref 3.5–5.1)
PROT UR STRIP-MCNC: NEGATIVE MG/DL
PROTHROMBIN TIME: 10.2 SECONDS (ref 9.5–11.5)
RBC # BLD AUTO: 4.16 MIL/CMM (ref 4.3–5.7)
RBC # UR STRIP: (no result) /UL
RBC #/AREA URNS HPF: (no result) /HPF (ref 0–5)
SODIUM SERPL-SCNC: 138 MEQ/L (ref 136–145)
TRIGL SERPL-MCNC: 192 MG/DL (ref ?–150)
UROBILINOGEN UR STRIP-ACNC: 0.2 E.U./DL (ref 0.2–1)
WBC # BLD AUTO: 6.8 TH/CMM (ref 4.8–10.8)
WBC #/AREA URNS HPF: (no result) /HPF (ref 0–5)

## 2017-04-10 NOTE — DIAGNOSTIC IMAGING REPORT
Renal ultrasound



HISTORY: Pain



The right kidney is normal in size (11.6 x 4.3 x 6.2 cm). A 1.0 cm

echogenic density is noted in the upper cortical medullary junction

region. This may represent a calculus. No hydronephrosis.



The left kidney is normal in size (10.4 x 5.9 x 4.3 cm). A 1.2 cm

sonolucent lesion seen in the upper pole consistent with a cyst. No

hydronephrosis.



No abnormalities are seen in the region of the urinary bladder.



IMPRESSION:

1. Small echogenic density in the upper portion of the right kidney.

This may represent a calculus. No hydronephrosis

2. Findings consistent with a small left renal cyst
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
## 2017-04-11 ENCOUNTER — HOSPITAL ENCOUNTER (EMERGENCY)
Dept: HOSPITAL 36 - ER | Age: 57
Discharge: LEFT BEFORE DISCHARGE | End: 2017-04-11
Payer: MEDICARE

## 2017-04-11 ENCOUNTER — HOSPITAL ENCOUNTER (EMERGENCY)
Dept: HOSPITAL 4 - SED | Age: 57
Discharge: LEFT BEFORE DISCHARGE | End: 2017-04-11
Payer: MEDICARE

## 2017-04-11 VITALS — SYSTOLIC BLOOD PRESSURE: 132 MMHG | DIASTOLIC BLOOD PRESSURE: 97 MMHG

## 2017-04-11 VITALS — WEIGHT: 175 LBS | BODY MASS INDEX: 25.92 KG/M2 | HEIGHT: 69 IN

## 2017-04-11 VITALS — SYSTOLIC BLOOD PRESSURE: 145 MMHG

## 2017-04-11 DIAGNOSIS — Z88.6: ICD-10-CM

## 2017-04-11 DIAGNOSIS — R19.7: ICD-10-CM

## 2017-04-11 DIAGNOSIS — R10.9: Primary | ICD-10-CM

## 2017-04-11 DIAGNOSIS — J45.909: ICD-10-CM

## 2017-04-11 DIAGNOSIS — R10.84: Primary | ICD-10-CM

## 2017-04-11 DIAGNOSIS — Z90.49: ICD-10-CM

## 2017-04-11 DIAGNOSIS — F17.200: ICD-10-CM

## 2017-04-11 DIAGNOSIS — I10: ICD-10-CM

## 2017-04-11 DIAGNOSIS — J44.9: ICD-10-CM

## 2017-04-11 DIAGNOSIS — Z53.21: ICD-10-CM

## 2017-04-11 LAB
ALBUMIN SERPL-MCNC: 3.9 G/DL (ref 3.4–5)
ALP SERPL-CCNC: 194 U/L (ref 46–116)
ALT SERPL-CCNC: 60 U/L (ref 16–63)
AMYLASE SERPL-CCNC: 50 U/L (ref 29–103)
AMYLASE SERPL-CCNC: 70 U/L (ref 25–115)
ANION GAP SERPL CALC-SCNC: 10.8 MMOL/L (ref 7–16)
AST SERPL-CCNC: 59 U/L (ref 15–37)
BACTERIA #/AREA URNS HPF: (no result) /HPF
BASOPHILS NFR BLD AUTO: 0.4 % (ref 0–2)
BASOPHILS NFR BLD: 2 % (ref 0–2)
BILIRUB SERPL-MCNC: 0.25 MG/DL (ref 0.2–1)
BILIRUB UR-MCNC: NEGATIVE MG/DL
BUN SERPL-MCNC: 17 MG/DL (ref 7–25)
BUN SERPL-MCNC: 18 MG/DL (ref 7–18)
BUN/CREAT SERPL: 18.9
CALCIUM SERPL-MCNC: 9.2 MG/DL (ref 8.5–10.1)
CALCIUM SERPL-MCNC: 9.5 MG/DL (ref 8.6–10.3)
CHLORIDE SERPL-SCNC: 106 MEQ/L (ref 98–107)
CHLORIDE SERPL-SCNC: 106 MMOL/L (ref 98–107)
CO2 SERPL-SCNC: 23.7 MEQ/L (ref 21–31)
CO2 SERPL-SCNC: 25 MMOL/L (ref 21–32)
COLOR UR: YELLOW
CREAT SERPL-MCNC: 0.8 MG/DL (ref 0.7–1.3)
CREAT SERPL-MCNC: 0.9 MG/DL (ref 0.7–1.3)
EOSINOPHIL NFR BLD AUTO: 1.8 % (ref 0–5)
EPI CELLS URNS QL MICRO: (no result) /LPF
ERYTHROCYTE [DISTWIDTH] IN BLOOD BY AUTOMATED COUNT: 13.3 % (ref 11.5–20)
ERYTHROCYTE [DISTWIDTH] IN BLOOD BY AUTOMATED COUNT: 13.8 % (ref 11.5–14.5)
GFR SERPLBLD BASED ON 1.73 SQ M-ARVRAT: > 60 ML/MIN
GLUCOSE SERPL-MCNC: 102 MG/DL (ref 74–106)
GLUCOSE SERPL-MCNC: 99 MG/DL (ref 70–105)
GLUCOSE UR STRIP-MCNC: NEGATIVE MG/DL
HCT VFR BLD CALC: 38.5 % (ref 39–49)
HGB BLD-MCNC: 13.6 GM/DL (ref 13.2–17.3)
KETONES UR STRIP-MCNC: NEGATIVE MG/DL
LIPASE SERPL-CCNC: 218 IU/L (ref 73–393)
LIPASE SERPL-CCNC: 27 U/L (ref 11–82)
LYMPHOCYTES NFR BLD AUTO: 16.2 % (ref 20–50)
MCH RBC QN AUTO: 32.6 PG (ref 26–30)
MCHC RBC AUTO-ENTMCNC: 35.3 PG (ref 28–36)
MCV RBC AUTO: 92.4 FL (ref 80–99)
MONOCYTES NFR BLD AUTO: 11.2 % (ref 2–10)
NEUTROPHILS # BLD: 3.6 TH/CMM (ref 1.8–8)
NEUTROPHILS NFR BLD AUTO: 70.4 % (ref 40–80)
PH UR STRIP: 5.5 [PH]
PLATELET # BLD: 244 TH/CMM (ref 150–400)
PLATELET # BLD: 316 X10^3MCL (ref 130–400)
PMV BLD AUTO: 7 FL
POTASSIUM SERPL-SCNC: 3.5 MEQ/L (ref 3.5–5.1)
POTASSIUM SERPL-SCNC: 3.8 MMOL/L (ref 3.5–5.1)
PROT SERPL-MCNC: 6.9 G/DL (ref 6.4–8.2)
PROT UR STRIP-MCNC: 30 MG/DL
RBC # BLD AUTO: 4.17 MIL/CMM (ref 4.3–5.7)
RBC # UR STRIP: (no result) /UL
RBC #/AREA URNS HPF: (no result) /HPF (ref 0–5)
SODIUM SERPL-SCNC: 137 MEQ/L (ref 136–145)
SODIUM SERPL-SCNC: 140 MMOL/L (ref 136–145)
UROBILINOGEN UR STRIP-ACNC: 0.2 E.U./DL (ref 0.2–1)
WBC # BLD AUTO: 5.1 TH/CMM (ref 4.8–10.8)
WBC #/AREA URNS HPF: (no result) /HPF (ref 0–5)

## 2017-04-11 NOTE — NUR
Patient unable to be found in waiting room. Called to room and unable to find 
patient in waiting area. Will call again.

## 2017-04-11 NOTE — ED PHYSICIAN CHART
Chief Complaint/HPI





- Patient Information


Date Seen:: 04/11/17


Time Seen:: 17:45


Chief Complaint:: Abdominal Pain


History of Present Illness:: 





onset x one month of intermittent, diffuse, crampy Abdominal Pain with diarrhea 

x 2 days; denies A/N/V/C, fever, chills, C/P, dyspnea


Allergies:: 


 Allergies











Allergy/AdvReac Type Severity Reaction Status Date / Time


 


ketorolac tromethamine Allergy   Verified 04/10/17 13:09





[From Toradol]     


 


morphine Allergy   Verified 04/10/17 13:09











Historian:: Patient, Medical Records


Review:: Nurse's Note Reviewed, Old Chart Reviewed





Review of Systems





- Review of Systems


General/Constitutional: Fever, No fever, Chills, No chills, No weight loss, No 

weakness, No diaphoresis, No edema, No loss of appetite


Skin: No skin lesions, No rash, No bruising


Head: No headache, No light-headedness


Eyes: No loss of vision, No pain, No diplopia


ENT: No earache, No nasal drainage, No sore throat, No tinnitus


Neck: No neck pain, No swelling, No thyromegaly, No stiffness, No mass noted


Cardio Vascular: No chest pain, No palpitations, No PND, No orthopnea, No edema


Pulmonary: No SOB, No cough, No sputum, No wheezing


GI: Nausea, No nausea, Vomiting, No vomiting, Diarrhea, No diarrhea, Pain, No 

pain, No melena, No hematochezia, No constipation, No hematemesis


G/U: No dysuria, No frequency, No hematuria


Musculoskeletal: No bone or joint pain, No back pain, No muscle pain


Endocrine: No polyuria, No polydipsia


Psychiatric: No prior psych history, No depression, No anxiety, No suicidal 

ideation


Hematopoietic: No bruising, No lymphadenopathy


Allergic/Immuno: No urticaria, No angioedema


Neurological: No syncope, No focal symptoms, No weakness, No paresthesia, No 

headache, No seizure, No dizziness, No confusion, No vertigo





Past Medical History





- Past Medical History


Past Medical History: HTN, Asthma/COPD


Family History: Diabetes Melitus, HTN


Social History: Smoker, Alcohol, No Drug Use


Surgical History: Cholecystectomy


Psychiatricy History: None


Medication: Reviewed





Family Medical History





- Family Member


  ** Mother


History Unknown: Yes


Ethnicity: Non-


Living Status: Still Living


Hx Family Dementia: Yes





  ** Father


History Unknown: Yes


Ethnicity: Non-


Living Status: Still Living


Hx Family Cancer: No


Hx Family Coronary Artery Disease: No


Hx Family Congestive Heart Failure: No


Hx Family Hypertension: No


Hx Family Stroke: No


Hx Family Diabetes: No


Hx Family Seizures: No


Hx Family Dementia: Yes


Hx Family AIDS: No


Hx Family HIV: No


Hx Family COPD: No


Hx Family Hepatitis: No


Hx Family Psychiatric Problems: No


Hx Family Tuberculosis: No





Physical Exam





- Physical Examination


General/Constitutional: Awake, Well-developed, well-nourished, Alert, No 

distress, GCS 15, Non-toxic appearing, Ambulatory


Head: Atraumatic


Eyes: Lids, conjuctiva normal, PERRL, EOMI


Skin: Nl inspection, No rash, No skin lesions, No ecchymosis, Well hydrated, No 

lymphadenopathy


ENMT: External ears, nose nl, Nasal exam nl, Lips, teeth, gums nl


Neck: Nontender, Full ROM w/o pain, No JVD, No nuchal rigidity, No bruit, No 

mass, No stridor


Respiratory: Nl effort/Exclusion, Clear to Auscultation, No Wheeze/Rhonchi/Rales


Cardio Vascular: RRR, No murmur, gallop, rubs, NL S1 S2


GI: No tenderness/rebounding/guarding, No organomegaly, No hernia, Normal BS's, 

Nondistended, No mass/bruits, No McBurney tenderness


: No CVA tenderness


Extremities: No tenderness or effusion, Full ROM, normal strength in all 

extremities, No edema, Normal digits & nails


Neuro/Psych: Alert/oriented, DTR's symmetric, Normal sensory exam, Normal motor 

strength, Judgement/insight normal, Mood normal, Normal gait, No focal deficits


Misc: normal gait, Normal back, No paraspinal tenderness





ED Septic Shock





- .


Is Septic Shock (SBP<90, OR Lactate>4 mmol\L) present?: No





Reassessment (Disposition)





- Reassessment


Reassessment Condition:: Improved





- Diagnosis


Diagnosis:: 





Chronic Pain Syndrome





- Aftercare/Follow up Instructions


Aftercare/Follow-Up Instructions:: Counseled pt regarding lab results/diagnosis 

& need follow up, Refer to Discharge Instructions, Counseled pt & family 

regarding lab results/diagnosis & need follow up





- Patient Disposition


Discharge/Transfer:: Elope/AWOL (RTER prn if existing s/s reoccur and/or get 

worse and/or any other new s/s occur; ACIs explained to pt; F/U with PMD in one 

day or prn; RTER prn ER prn if concerned)





ED Discharge Plan





- Patient Disposition


Admit/Discharge/Transfer: PATIENT ELOPED


Condition at Disposition: Stable

## 2017-04-21 ENCOUNTER — HOSPITAL ENCOUNTER (EMERGENCY)
Dept: HOSPITAL 1 - ED | Age: 57
Discharge: LEFT BEFORE DISCHARGE | End: 2017-04-21
Payer: COMMERCIAL

## 2017-04-21 VITALS — HEIGHT: 72 IN | WEIGHT: 142.48 LBS | BODY MASS INDEX: 19.3 KG/M2

## 2017-04-21 VITALS — SYSTOLIC BLOOD PRESSURE: 142 MMHG | DIASTOLIC BLOOD PRESSURE: 99 MMHG

## 2017-04-21 DIAGNOSIS — Z88.6: ICD-10-CM

## 2017-04-21 DIAGNOSIS — Z79.899: ICD-10-CM

## 2017-04-21 DIAGNOSIS — M54.9: ICD-10-CM

## 2017-04-21 DIAGNOSIS — R10.9: Primary | ICD-10-CM

## 2017-04-21 DIAGNOSIS — Z87.19: ICD-10-CM

## 2017-04-21 DIAGNOSIS — Z88.5: ICD-10-CM

## 2017-04-21 DIAGNOSIS — Z87.442: ICD-10-CM

## 2017-04-21 DIAGNOSIS — J44.9: ICD-10-CM

## 2017-04-21 DIAGNOSIS — I10: ICD-10-CM

## 2017-04-22 ENCOUNTER — HOSPITAL ENCOUNTER (EMERGENCY)
Dept: HOSPITAL 1 - ED | Age: 57
Discharge: HOME | End: 2017-04-22
Payer: COMMERCIAL

## 2017-04-22 ENCOUNTER — HOSPITAL ENCOUNTER (EMERGENCY)
Dept: HOSPITAL 4 - SED | Age: 57
Discharge: HOME | End: 2017-04-22
Payer: MEDICARE

## 2017-04-22 ENCOUNTER — HOSPITAL ENCOUNTER (EMERGENCY)
Dept: HOSPITAL 36 - ER | Age: 57
Discharge: LEFT BEFORE DISCHARGE | End: 2017-04-22
Payer: MEDICARE

## 2017-04-22 VITALS — SYSTOLIC BLOOD PRESSURE: 147 MMHG

## 2017-04-22 VITALS — HEIGHT: 72 IN | WEIGHT: 142.11 LBS | BODY MASS INDEX: 19.25 KG/M2

## 2017-04-22 VITALS — SYSTOLIC BLOOD PRESSURE: 124 MMHG | DIASTOLIC BLOOD PRESSURE: 78 MMHG

## 2017-04-22 VITALS — HEIGHT: 72 IN | BODY MASS INDEX: 20.32 KG/M2 | SYSTOLIC BLOOD PRESSURE: 152 MMHG | WEIGHT: 150 LBS

## 2017-04-22 DIAGNOSIS — Z88.5: ICD-10-CM

## 2017-04-22 DIAGNOSIS — J44.1: Primary | ICD-10-CM

## 2017-04-22 DIAGNOSIS — F17.200: ICD-10-CM

## 2017-04-22 DIAGNOSIS — J44.9: ICD-10-CM

## 2017-04-22 DIAGNOSIS — I10: ICD-10-CM

## 2017-04-22 DIAGNOSIS — G89.29: Primary | ICD-10-CM

## 2017-04-22 DIAGNOSIS — Z88.6: ICD-10-CM

## 2017-04-22 DIAGNOSIS — Z88.8: ICD-10-CM

## 2017-04-22 DIAGNOSIS — Z90.49: ICD-10-CM

## 2017-04-22 DIAGNOSIS — R10.9: ICD-10-CM

## 2017-04-22 DIAGNOSIS — R10.13: ICD-10-CM

## 2017-04-22 DIAGNOSIS — R10.9: Primary | ICD-10-CM

## 2017-04-22 LAB
ALBUMIN SERPL-MCNC: 4.1 G/DL (ref 3.4–5)
ALP SERPL-CCNC: 207 U/L (ref 46–116)
ALT SERPL-CCNC: 63 U/L (ref 16–63)
AST SERPL-CCNC: 35 U/L (ref 15–37)
BASOPHILS NFR BLD: 0 % (ref 0–2)
BILIRUB SERPL-MCNC: 0.34 MG/DL (ref 0.2–1)
BUN SERPL-MCNC: 21 MG/DL (ref 7–18)
CALCIUM SERPL-MCNC: 9.8 MG/DL (ref 8.5–10.1)
CHLORIDE SERPL-SCNC: 104 MMOL/L (ref 98–107)
CO2 SERPL-SCNC: 22.1 MMOL/L (ref 21–32)
CREAT SERPL-MCNC: 1.3 MG/DL (ref 0.7–1.3)
ERYTHROCYTE [DISTWIDTH] IN BLOOD BY AUTOMATED COUNT: 14.4 % (ref 11.5–14.5)
GFR SERPLBLD BASED ON 1.73 SQ M-ARVRAT: > 60 ML/MIN
GLUCOSE SERPL-MCNC: 287 MG/DL (ref 74–106)
LIPASE SERPL-CCNC: 90 IU/L (ref 73–393)
PLATELET # BLD: 280 X10^3MCL (ref 130–400)
POTASSIUM SERPL-SCNC: 3.8 MMOL/L (ref 3.5–5.1)
PROT SERPL-MCNC: 7.5 G/DL (ref 6.4–8.2)
SODIUM SERPL-SCNC: 137 MMOL/L (ref 136–145)

## 2017-04-22 PROCEDURE — 96372 THER/PROPH/DIAG INJ SC/IM: CPT

## 2017-04-22 PROCEDURE — C9113 INJ PANTOPRAZOLE SODIUM, VIA: HCPCS

## 2017-04-22 PROCEDURE — 99284 EMERGENCY DEPT VISIT MOD MDM: CPT

## 2017-04-22 PROCEDURE — 94640 AIRWAY INHALATION TREATMENT: CPT

## 2017-04-22 PROCEDURE — 71010: CPT

## 2017-04-22 PROCEDURE — Z7502: HCPCS

## 2017-04-22 PROCEDURE — 93005 ELECTROCARDIOGRAM TRACING: CPT

## 2017-04-22 NOTE — ED PHYSICIAN CHART
Chief Complaint/HPI





- Patient Information


Date Seen:: 04/22/17


Time Seen:: 01:55


Chief Complaint:: ABDOMINAL PAIN


History of Present Illness:: 





THE PATIENT IS CONCERNED ABOUT ABDOMINAL PAIN BUT ELOPED WITHOUT AN EXPLANATION.


Allergies:: 


 Allergies











Allergy/AdvReac Type Severity Reaction Status Date / Time


 


ketorolac tromethamine Allergy   Verified 04/10/17 13:09





[From Toradol]     


 


morphine Allergy   Verified 04/10/17 13:09











Vitals:: 


 Vital Signs - 8 hr











  04/22/17





  01:55


 


Temp 98.4 F


 





 


RR 18


 


/91


 


O2 Sat % 97














Family Medical History





- Family Member


  ** Mother


History Unknown: Yes


Ethnicity: Non-


Living Status: Still Living


Hx Family Dementia: Yes





  ** Father


History Unknown: Yes


Ethnicity: Non-


Living Status: Still Living


Hx Family Cancer: No


Hx Family Coronary Artery Disease: No


Hx Family Congestive Heart Failure: No


Hx Family Hypertension: No


Hx Family Stroke: No


Hx Family Diabetes: No


Hx Family Seizures: No


Hx Family Dementia: Yes


Hx Family AIDS: No


Hx Family HIV: No


Hx Family COPD: No


Hx Family Hepatitis: No


Hx Family Psychiatric Problems: No


Hx Family Tuberculosis: No





ED Septic Shock





- .


Is Septic Shock (SBP<90, OR Lactate>4 mmol\L) present?: No





- <6hrs of presentation:


Vital Signs: 


 Vital Signs - 8 hr











  04/22/17





  01:55


 


Temp 98.4 F


 





 


RR 18


 


/91


 


O2 Sat % 97

## 2017-04-25 ENCOUNTER — HOSPITAL ENCOUNTER (EMERGENCY)
Dept: HOSPITAL 4 - SED | Age: 57
Discharge: HOME | End: 2017-04-25
Payer: MEDICARE

## 2017-04-25 VITALS — SYSTOLIC BLOOD PRESSURE: 130 MMHG

## 2017-04-25 VITALS — BODY MASS INDEX: 20.32 KG/M2 | WEIGHT: 150 LBS | HEIGHT: 72 IN

## 2017-04-25 VITALS — SYSTOLIC BLOOD PRESSURE: 163 MMHG

## 2017-04-25 DIAGNOSIS — J44.9: ICD-10-CM

## 2017-04-25 DIAGNOSIS — R10.30: Primary | ICD-10-CM

## 2017-04-25 DIAGNOSIS — I10: ICD-10-CM

## 2017-04-25 LAB
APPEARANCE UR: CLEAR
BACTERIA URNS QL MICRO: (no result) /HPF
BILIRUB UR QL STRIP: NEGATIVE
COLOR UR: YELLOW
GLUCOSE UR STRIP-MCNC: NEGATIVE MG/DL
HGB UR QL STRIP: (no result)
KETONES UR STRIP-MCNC: NEGATIVE MG/DL
LEUKOCYTE ESTERASE UR QL STRIP: NEGATIVE
NITRITE UR QL STRIP: NEGATIVE
PH UR STRIP: 6 [PH] (ref 5–8)
PROT UR QL STRIP: NEGATIVE
RBC #/AREA URNS HPF: (no result) /HPF (ref 0–3)
SP GR UR STRIP: 1.02 (ref 1–1.03)
UROBILINOGEN UR STRIP-MCNC: 0.2 MG/DL (ref 0.2–1)
WBC #/AREA URNS HPF: (no result) /HPF (ref 0–3)

## 2018-01-04 ENCOUNTER — HOSPITAL ENCOUNTER (EMERGENCY)
Dept: HOSPITAL 36 - ER | Age: 58
Discharge: HOME | End: 2018-01-04
Payer: MEDICARE

## 2018-01-04 DIAGNOSIS — Z88.6: ICD-10-CM
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
## 2018-01-04 DIAGNOSIS — J45.909: ICD-10-CM

## 2018-01-04 DIAGNOSIS — Z88.8: ICD-10-CM
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
## 2018-01-04 DIAGNOSIS — T21.30XA: Primary | ICD-10-CM

## 2018-01-04 DIAGNOSIS — J44.9: ICD-10-CM

## 2018-01-04 DIAGNOSIS — F17.200: ICD-10-CM
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
## 2018-01-04 PROCEDURE — Z7502: HCPCS
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
## 2018-01-14 ENCOUNTER — HOSPITAL ENCOUNTER (EMERGENCY)
Dept: HOSPITAL 1 - ED | Age: 58
Discharge: HOME | End: 2018-01-14
Payer: COMMERCIAL

## 2018-01-14 VITALS — HEIGHT: 72 IN | BODY MASS INDEX: 22.21 KG/M2 | WEIGHT: 163.98 LBS

## 2018-01-14 VITALS — DIASTOLIC BLOOD PRESSURE: 82 MMHG | SYSTOLIC BLOOD PRESSURE: 122 MMHG
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
## 2018-01-14 DIAGNOSIS — S30.0XXA: ICD-10-CM
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
## 2018-01-14 DIAGNOSIS — I10: ICD-10-CM
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
## 2018-01-14 DIAGNOSIS — Z88.5: ICD-10-CM
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
## 2018-01-14 DIAGNOSIS — S13.4XXA: Primary | ICD-10-CM

## 2018-01-14 DIAGNOSIS — Z88.8: ICD-10-CM

## 2018-01-26 ENCOUNTER — HOSPITAL ENCOUNTER (EMERGENCY)
Dept: HOSPITAL 36 - ER | Age: 58
Discharge: HOME | End: 2018-01-26
Payer: MEDICARE

## 2018-01-26 DIAGNOSIS — R10.9: Primary | ICD-10-CM

## 2018-01-26 DIAGNOSIS — J44.9: ICD-10-CM

## 2018-01-26 DIAGNOSIS — I10: ICD-10-CM

## 2018-01-26 DIAGNOSIS — F17.200: ICD-10-CM

## 2018-01-26 DIAGNOSIS — J45.909: ICD-10-CM

## 2018-01-26 LAB
ALBUMIN SERPL-MCNC: 4.4 GM/DL (ref 4.2–5.5)
ALBUMIN/GLOB SERPL: 1.6 {RATIO} (ref 1–1.8)
ALP SERPL-CCNC: 222 U/L (ref 34–104)
ALT SERPL-CCNC: 50 U/L (ref 7–52)
AMPHET UR-MCNC: NEGATIVE NG/ML
AMYLASE SERPL-CCNC: 50 U/L (ref 29–103)
ANION GAP SERPL CALC-SCNC: 10.3 MMOL/L (ref 7–16)
APPEARANCE UR: CLEAR
AST SERPL-CCNC: 19 U/L (ref 13–39)
BACTERIA #/AREA URNS HPF: (no result) /HPF
BARBITURATES UR-MCNC: NEGATIVE UG/ML
BASOPHILS # BLD AUTO: 0 TH/CUMM (ref 0–0.2)
BASOPHILS NFR BLD AUTO: 0.3 % (ref 0–2)
BENZODIAZEPINES PNL UR: NEGATIVE
BILIRUB SERPL-MCNC: 0.9 MG/DL (ref 0.3–1)
BILIRUB UR-MCNC: (no result) MG/DL
BUN SERPL-MCNC: 14 MG/DL (ref 7–25)
CALCIUM SERPL-MCNC: 9.2 MG/DL (ref 8.6–10.3)
CANNABINOIDS SERPL QL CFM: NEGATIVE
CHLORIDE SERPL-SCNC: 106 MEQ/L (ref 98–107)
CO2 SERPL-SCNC: 23.4 MEQ/L (ref 21–31)
COCAINE METAB.OTHER UR-MCNC: NEGATIVE NG/ML
COLOR UR: YELLOW
CREAT SERPL-MCNC: 0.7 MG/DL (ref 0.7–1.3)
EOSINOPHIL # BLD AUTO: 0.7 TH/CMM (ref 0.1–0.4)
EOSINOPHIL NFR BLD AUTO: 13.5 % (ref 0–5)
EPI CELLS URNS QL MICRO: (no result) /LPF
ERYTHROCYTE [DISTWIDTH] IN BLOOD BY AUTOMATED COUNT: 12.9 % (ref 11.5–20)
GLOBULIN SER-MCNC: 2.7 GM/DL
GLUCOSE SERPL-MCNC: 95 MG/DL (ref 70–105)
GLUCOSE UR STRIP-MCNC: NEGATIVE MG/DL
HCT VFR BLD CALC: 51.4 % (ref 41–60)
HGB BLD-MCNC: 17 GM/DL (ref 12–16)
KETONES UR STRIP-MCNC: NEGATIVE MG/DL
LEUKOCYTE ESTERASE UR-ACNC: NEGATIVE
LIPASE SERPL-CCNC: 40 U/L (ref 11–82)
LYMPHOCYTE AB SER FC-ACNC: 1.7 TH/CMM (ref 1.5–3)
LYMPHOCYTES NFR BLD AUTO: 31.3 % (ref 20–50)
MCH RBC QN AUTO: 32.9 PG (ref 26–30)
MCHC RBC AUTO-ENTMCNC: 33 PG (ref 28–36)
MCV RBC AUTO: 99.7 FL (ref 80–99)
METHADONE UR CFM-MCNC: NEGATIVE NG/ML
METHAMPHET UR QL: NEGATIVE
MICRO URNS: YES
MONOCYTES # BLD AUTO: 0.4 TH/CMM (ref 0.3–1)
MONOCYTES NFR BLD AUTO: 7.7 % (ref 2–10)
NEUTROPHILS # BLD: 2.7 TH/CMM (ref 1.8–8)
NEUTROPHILS NFR BLD AUTO: 47.2 % (ref 40–80)
NITRITE UR QL STRIP: NEGATIVE
OPIATES UR QL: NEGATIVE
PCP UR-MCNC: NEGATIVE UG/L
PH UR STRIP: 6 [PH] (ref 4.6–8)
PLATELET # BLD: 230 TH/CMM (ref 150–400)
PMV BLD AUTO: 7.3 FL
POTASSIUM SERPL-SCNC: 3.7 MEQ/L (ref 3.5–5.1)
PROT UR STRIP-MCNC: (no result) MG/DL
RBC # BLD AUTO: 5.15 MIL/CMM (ref 4.3–5.7)
RBC # UR STRIP: (no result) /UL
RBC #/AREA URNS HPF: (no result) /HPF (ref 0–5)
SODIUM SERPL-SCNC: 136 MEQ/L (ref 136–145)
SP GR UR STRIP: 1.02 (ref 1–1.03)
TRICYCLICS UR QL: NEGATIVE
URINALYSIS COMPLETE PNL UR: (no result)
UROBILINOGEN UR STRIP-ACNC: 0.2 E.U./DL (ref 0.2–1)
WBC # BLD AUTO: 5.5 TH/CMM (ref 4.8–10.8)
WBC #/AREA URNS HPF: (no result) /HPF (ref 0–5)

## 2018-01-26 PROCEDURE — 99285 EMERGENCY DEPT VISIT HI MDM: CPT

## 2018-01-26 PROCEDURE — 74176 CT ABD & PELVIS W/O CONTRAST: CPT

## 2018-01-26 PROCEDURE — 85025 COMPLETE CBC W/AUTO DIFF WBC: CPT

## 2018-01-26 PROCEDURE — 36415 COLL VENOUS BLD VENIPUNCTURE: CPT

## 2018-01-26 PROCEDURE — 80053 COMPREHEN METABOLIC PANEL: CPT

## 2018-01-26 PROCEDURE — 96375 TX/PRO/DX INJ NEW DRUG ADDON: CPT

## 2018-01-26 PROCEDURE — 81001 URINALYSIS AUTO W/SCOPE: CPT

## 2018-01-26 PROCEDURE — 82150 ASSAY OF AMYLASE: CPT

## 2018-01-26 PROCEDURE — 96365 THER/PROPH/DIAG IV INF INIT: CPT

## 2018-01-26 PROCEDURE — 83690 ASSAY OF LIPASE: CPT

## 2018-01-26 PROCEDURE — 76770 US EXAM ABDO BACK WALL COMP: CPT

## 2018-01-26 PROCEDURE — 80307 DRUG TEST PRSMV CHEM ANLYZR: CPT

## 2018-01-26 NOTE — ED PHYSICIAN CHART
ED Chief Complaint/HPI





- Patient Information


Date Seen:: 01/26/18


Time Seen:: 19:04


Chief Complaint:: Abdominal pain


History of Present Illness:: 


56 yo male had abdominal pain for 1 week. crampy pain, 8/10, constant, nausea, 

vomiting and diarrhea. Patient denied blood in stool, fever or chills. He 

reported history of colitis and diverticulitis, s/p colonoscopy 2 years ago. He 

was s/p cholecystectomy in 2016. He also had history of kidney stone, but he 

said the abdominal pain was different. 


Allergies:: 


 Allergies











Allergy/AdvReac Type Severity Reaction Status Date / Time


 


ketorolac tromethamine Allergy   Verified 04/10/17 13:09





[From Toradol]     


 


morphine Allergy   Verified 04/10/17 13:09











Vitals:: 


 Vital Signs - 8 hr











  01/26/18





  18:41


 


Temp 98 F


 


HR 91


 


RR 16


 


/100


 


O2 Sat % 94














ED Review of Systems





- Review of Systems


General/Constitutional: No fever


Skin: No bruising


Head: No headache


Eyes: No pain


ENT: No nasal drainage


Neck: No neck pain


Cardio Vascular: No chest pain


Pulmonary: No SOB


GI: Nausea, Vomiting, Diarrhea, Pain


G/U: Hematuria


Musculoskeletal: No bone or joint pain


Neurological: No focal symptoms





ED Past Medical History





- Past Medical History


Past Medical History: HTN, Asthma/COPD, Renal stone, Other (colitis, 

diverticulitis)


Social History: Smoker, No Alcohol, No Drug Use


Surgical History: Cholecystectomy, other (history of tracheostomy, laparotomy)





Family Medical History





- Family Member


  ** Mother


History Unknown: Yes


Ethnicity: Non-


Living Status: Unknown


Hx Family Dementia: Yes





  ** Father


History Unknown: Yes


Name:: JAIME


Ethnicity: Non-


Living Status: Still Living


Hx Family Cancer: No


Hx Family Coronary Artery Disease: No


Hx Family Congestive Heart Failure: No


Hx Family Hypertension: No


Hx Family Stroke: No


Hx Family Diabetes: No


Hx Family Seizures: No


Hx Family Dementia: Yes


Hx Family AIDS: No


Hx Family HIV: No


Hx Family COPD: No


Hx Family Hepatitis: No


Hx Family Psychiatric Problems: No


Hx Family Tuberculosis: No


Other Medical History: ALZHEIMERS





ED Physical Exam





- Physical Examination


General/Constitutional: Awake


Head: Atraumatic


Eyes: PERRL


Skin: No ecchymosis


ENMT: Nasal exam nl


Neck: No nuchal rigidity


Respiratory: No Wheeze/Rhonchi/Rales


Cardio Vascular: RRR, No murmur, gallop, rubs, NL S1 S2


Other  comments:: 


B/l CVA tenderness


Extremities: normal strength in all extremities


Neuro/Psych: No focal deficits





ED Labs/Radiology/EKG Results





- Radiology Results


Results: 


CT abdomen/pelvis: multiple right and left renal caculi, diverticulosis


Renal u/s: no hydronephrosis





ED Assessment





- Assessment


General Assessment: 


Bilateral renal calculi


Ureteral colic


Hematuria


Assessment/Comments:: 


CBC, CMP, lipase


UA, urine drug screen


Renal u/s


CT abdomen/pelvis


Levaquin 500mg IV


NS 1L IV bolus


Dilaudid 0.5mg IV


D/c home 


Ciprofloxacin 500mg po bid


F/u PCP or return to ER if symptoms worsen








ED Septic Shock





- .


Is Septic Shock (SBP<90, OR Lactate>4 mmol\L) present?: No





- <6hrs of presentation:


Vital Signs: 


 Vital Signs - 8 hr











  01/26/18





  18:41


 


Temp 98 F


 


HR 91


 


RR 16


 


/100


 


O2 Sat % 94














ED Reassessment (Disposition)





- Reassessment


Reassessment Condition:: Improved





- Patient Disposition


Discharge/Transfer:: Home





ED Discharge Plan





- Patient Disposition


Admit/Discharge/Transfer: PT DISCHARGED HOME


Instructions:  Ureteral Colic, Easy-to-Read


Additional Instructions: 


follow up with your primary medical doctor asap


take prescribed medications as ordered

## 2018-01-27 NOTE — DIAGNOSTIC IMAGING REPORT
Renal ultrasound



HISTORY: Renal stones.



COMPARISON: CT abdomen and pelvis performed the same day and previous

renal ultrasound on 4/10/2017



Technique: Sonography of the kidneys and urinary bladder was performed

in multiple planes.



FINDINGS:



The right kidney measures 11.0 x 4.8 cm.  2 echogenic foci are seen

within the right kidney the largest measuring 1.3 cm.  No

hydronephrosis.  Evaluation of the left kidney is limited due to bowel

gas.  The left kidney measures 611.4 x 6.2 cm.  No discrete focal

lesions or evidence of hydronephrosis.  



The prevoid bladder volume is 146 mL's.  The patient refused to void. 

Mild generalized prominence of the urinary bladder wall is noted.



IMPRESSION:



No evidence of hydronephrosis



Echogenic foci within the right kidney the largest measuring 1.3 cm. 

Findings may represent renal stones.  Please refer to CT abdomen and

pelvis performed the same date for further details.



Mild prominence of the urinary bladder wall.  Inflammatory process

cannot be excluded.  Please correlate with clinical findings.

## 2018-01-27 NOTE — DIAGNOSTIC IMAGING REPORT
CT abdomen and pelvis without intravenous contrast



Indication: Abdominal pain



Comparison: Renal ultrasound the same day and previous CT abdomen and

pelvis on 4/1/2017,



Technique: Axial images were obtained from the lung bases to the

bilateral proximal femurs without IV contrast.  Coronal reconstructions

were made.  total DLP: 378,  CTDI8.3



FINDINGS: Hypoventilatory changes of the lung bases are noted. 

Assessment of the solid organs is limited due to lack of IV contrast. 

No evidence of focal hepatic lesions.  The patient is status post

cholecystectomy.  No focal splenic lesions.  No focal pancreatic or

adrenal lesions.  



3 mm nonobstructive left renal calculus is noted.  Multiple

nonobstructive right renal calculi are also noted largest measuring 3

mm.  Areas of mild renal scarring is noted.  There is a 1 cm low-density

left renal lesion along the mid pole probably representing a cyst. 

Prostate gland calcifications are noted.  



Moderate stool is seen throughout the colon with nonspecific gas-filled

loops of bowel.  No evidence of acute appendicitis.  Surgical clip is

seen within the pelvis.  Minimal diverticulosis is noted without

evidence of diverticulitis.  Degenerative changes of the spine are noted

greatest in the lower lumbar spine.



IMPRESSION:



Multiple nonobstructive right renal calculi the largest measuring 3 mm. 

Additional multiple nonobstructive left renal calculi are noted largest

measuring 3 mm.



Minimal diverticulosis without evidence of diverticulitis.  



Evidence of prior cholecystectomy.



Low-density left mid pole renal lesion probably representing a small

cyst measuring 1 cm.  Consider follow up if indicated.



Degenerative changes.

## 2018-02-11 ENCOUNTER — HOSPITAL ENCOUNTER (EMERGENCY)
Dept: HOSPITAL 1 - ED | Age: 58
Discharge: HOME | End: 2018-02-11
Payer: COMMERCIAL

## 2018-02-11 VITALS — BODY MASS INDEX: 22.08 KG/M2 | WEIGHT: 162.99 LBS | HEIGHT: 72.01 IN

## 2018-02-11 VITALS — DIASTOLIC BLOOD PRESSURE: 94 MMHG | SYSTOLIC BLOOD PRESSURE: 136 MMHG

## 2018-02-11 DIAGNOSIS — I10: ICD-10-CM

## 2018-02-11 DIAGNOSIS — R10.9: Primary | ICD-10-CM

## 2018-02-11 DIAGNOSIS — R19.7: ICD-10-CM

## 2018-02-11 DIAGNOSIS — R11.10: ICD-10-CM

## 2018-02-11 DIAGNOSIS — Z90.49: ICD-10-CM

## 2018-02-11 DIAGNOSIS — Z88.5: ICD-10-CM

## 2018-02-11 DIAGNOSIS — Z88.6: ICD-10-CM

## 2018-02-11 LAB
ALBUMIN SERPL-MCNC: 3.8 G/DL (ref 3.4–5)
ALP SERPL-CCNC: 196 U/L (ref 46–116)
ALT SERPL-CCNC: 23 U/L (ref 16–63)
AST SERPL-CCNC: 25 U/L (ref 15–37)
BASOPHILS NFR BLD: 0.5 % (ref 0–2)
BILIRUB SERPL-MCNC: 0.51 MG/DL (ref 0.2–1)
BUN SERPL-MCNC: 12 MG/DL (ref 7–18)
CALCIUM SERPL-MCNC: 8.9 MG/DL (ref 8.5–10.1)
CHLORIDE SERPL-SCNC: 105 MMOL/L (ref 98–107)
CO2 SERPL-SCNC: 27.1 MMOL/L (ref 21–32)
CREAT SERPL-MCNC: 0.9 MG/DL (ref 0.7–1.3)
ERYTHROCYTE [DISTWIDTH] IN BLOOD BY AUTOMATED COUNT: 13.7 % (ref 11.5–14.5)
GFR SERPLBLD BASED ON 1.73 SQ M-ARVRAT: > 60 ML/MIN
GLUCOSE SERPL-MCNC: 88 MG/DL (ref 74–106)
LIPASE SERPL-CCNC: 157 IU/L (ref 73–393)
MICROSCOPIC UR-IMP: YES
PLATELET # BLD: 193 X10^3MCL (ref 130–400)
POTASSIUM SERPL-SCNC: 4.2 MMOL/L (ref 3.5–5.1)
PROT SERPL-MCNC: 7.2 G/DL (ref 6.4–8.2)
RBC # UR STRIP.AUTO: (no result) /UL
SODIUM SERPL-SCNC: 141 MMOL/L (ref 136–145)
UA SPECIFIC GRAVITY: 1.02 (ref 1–1.03)

## 2018-03-04 ENCOUNTER — HOSPITAL ENCOUNTER (EMERGENCY)
Dept: HOSPITAL 36 - ER | Age: 58
Discharge: HOME | End: 2018-03-04
Payer: MEDICARE
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
## 2018-03-04 DIAGNOSIS — T18.128A: Primary | ICD-10-CM
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
## 2018-03-04 PROCEDURE — Z7502: HCPCS

## 2018-03-31 NOTE — ER PHYSICIAN DOCUMENTATION
DATE OF SERVICE:  03/04/2018



A 58-year-old male patient did came here on ____.



To the best of my knowledge, he has choked on a piece of meat, he was cooking

some meat when he came home and he choked on it.  It was in the center of his

chest.  He could hardly breathe.  He was very, very short of breath and the

patient's triage nurse saw the patient and the patient had back pain or injury. 

The patient does not tell me that the patient had back pain.  This is the nurse

writing that the patient had back pain or injury.  The patient did not have any

back pain.  The patient had some chest discomfort and he could not swallow and

patient has not traveled any place outside the country.  We will base the

command, coma scale is 15.  Vital signs are found to be within normal limits. 

Heart rate was 82, respiratory rate is 18, temperature 97.2.  Pain scale is 5. 

According to him, height is 15.24 cm, weight is 72.575.  Infection is none

known.  The patient was seen by me.  Vital signs were normal.  Chest was clear

and patient still had some pain in the epigastric area.  The patient was given

fluids.  The patient was given antibiotics.  The patient was given pantoprazole.

 The patient was given Cardizem and this Cardizem was given to dilate the

esophageal muscles, but the nurse instead of talking to me went and asked the

supervisor of the department at nighttime and the supervisor said Cardizem could

be given only for heart rate of 120 and for no other reason and I believe that

they have to go and read some more medicine to know why Cardizem can be given to

somebody.  Nitroglycerin was also given to the patient.  Pantoprazole was given.

 Reglan was given and patient after that, smooth muscle opened up, but the nurse

did not carry out my instruction, instead went to the patient and god knows what

they talked among themselves and patient refused to take any medication and was

very, very rude to me and the patient left after asking me to check his throat

that I remember; I checked his throat again, I checked him before, I checked him

again; there was nothing was seen and then the patient left the Emergency Room

and that the final diagnosis was the patient had a choking of big piece of meat

in the center of the esophagus.  So, this to the best of my knowledge and memory

was dictated.  But definitely the nurse did not carry out my orders.  Definitely

the nurse instead of talking to me, went to the supervisor.  The supervisor also

did not come and talk to me, knows only one indication of where to give this

medication.





DD: 03/29/2018 14:50

DT: 03/30/2018 18:56

JOB# [id number]  [id number]

## 2018-04-11 ENCOUNTER — HOSPITAL ENCOUNTER (EMERGENCY)
Dept: HOSPITAL 1 - ED | Age: 58
Discharge: HOME | End: 2018-04-11
Payer: COMMERCIAL

## 2018-04-11 VITALS — SYSTOLIC BLOOD PRESSURE: 110 MMHG | DIASTOLIC BLOOD PRESSURE: 77 MMHG

## 2018-04-11 DIAGNOSIS — J44.1: ICD-10-CM

## 2018-04-11 DIAGNOSIS — Z88.8: ICD-10-CM

## 2018-04-11 DIAGNOSIS — Z88.5: ICD-10-CM

## 2018-04-11 DIAGNOSIS — I10: ICD-10-CM

## 2018-04-11 DIAGNOSIS — Z90.49: ICD-10-CM

## 2018-04-11 DIAGNOSIS — J40: Primary | ICD-10-CM

## 2018-04-11 LAB
ALBUMIN SERPL-MCNC: 3.6 G/DL (ref 3.4–5)
ALP SERPL-CCNC: 335 U/L (ref 46–116)
ALT SERPL-CCNC: 350 U/L (ref 16–63)
AST SERPL-CCNC: 409 U/L (ref 15–37)
BASOPHILS NFR BLD: 0.2 % (ref 0–2)
BILIRUB SERPL-MCNC: 1.3 MG/DL (ref 0.2–1)
BUN SERPL-MCNC: 20 MG/DL (ref 7–18)
CALCIUM SERPL-MCNC: 8.3 MG/DL (ref 8.5–10.1)
CHLORIDE SERPL-SCNC: 100 MMOL/L (ref 98–107)
CO2 SERPL-SCNC: 27 MMOL/L (ref 21–32)
CREAT SERPL-MCNC: 1 MG/DL (ref 0.7–1.3)
ERYTHROCYTE [DISTWIDTH] IN BLOOD BY AUTOMATED COUNT: 14.4 % (ref 11.5–14.5)
GFR SERPLBLD BASED ON 1.73 SQ M-ARVRAT: > 60 ML/MIN
GLUCOSE SERPL-MCNC: 83 MG/DL (ref 74–106)
PLATELET # BLD: 205 X10^3MCL (ref 130–400)
POTASSIUM SERPL-SCNC: 4.1 MMOL/L (ref 3.5–5.1)
PROT SERPL-MCNC: 7.1 G/DL (ref 6.4–8.2)
SODIUM SERPL-SCNC: 137 MMOL/L (ref 136–145)

## 2018-04-17 ENCOUNTER — HOSPITAL ENCOUNTER (INPATIENT)
Dept: HOSPITAL 1 - ED | Age: 58
LOS: 2 days | Discharge: HOME | DRG: 206 | End: 2018-04-19
Attending: FAMILY MEDICINE | Admitting: FAMILY MEDICINE
Payer: COMMERCIAL

## 2018-04-17 VITALS
BODY MASS INDEX: 21.43 KG/M2 | HEIGHT: 72.01 IN | BODY MASS INDEX: 21.43 KG/M2 | WEIGHT: 158.25 LBS | HEIGHT: 72.01 IN | WEIGHT: 158.25 LBS

## 2018-04-17 VITALS — SYSTOLIC BLOOD PRESSURE: 141 MMHG | DIASTOLIC BLOOD PRESSURE: 90 MMHG

## 2018-04-17 VITALS — DIASTOLIC BLOOD PRESSURE: 91 MMHG | SYSTOLIC BLOOD PRESSURE: 141 MMHG

## 2018-04-17 DIAGNOSIS — J44.1: ICD-10-CM

## 2018-04-17 DIAGNOSIS — I10: ICD-10-CM

## 2018-04-17 DIAGNOSIS — Z82.0: ICD-10-CM

## 2018-04-17 DIAGNOSIS — Z88.5: ICD-10-CM

## 2018-04-17 DIAGNOSIS — J44.0: ICD-10-CM

## 2018-04-17 DIAGNOSIS — Z90.49: ICD-10-CM

## 2018-04-17 DIAGNOSIS — M94.0: Primary | ICD-10-CM

## 2018-04-17 DIAGNOSIS — F17.210: ICD-10-CM

## 2018-04-17 DIAGNOSIS — I42.9: ICD-10-CM

## 2018-04-17 DIAGNOSIS — Z83.79: ICD-10-CM

## 2018-04-17 DIAGNOSIS — E78.5: ICD-10-CM

## 2018-04-17 DIAGNOSIS — E87.6: ICD-10-CM

## 2018-04-17 DIAGNOSIS — J20.9: ICD-10-CM

## 2018-04-17 DIAGNOSIS — G62.9: ICD-10-CM

## 2018-04-17 DIAGNOSIS — F11.20: ICD-10-CM

## 2018-04-17 DIAGNOSIS — K21.9: ICD-10-CM

## 2018-04-17 DIAGNOSIS — Z88.8: ICD-10-CM

## 2018-04-17 LAB
ALBUMIN SERPL-MCNC: 3.2 G/DL (ref 3.4–5)
ALP SERPL-CCNC: 220 U/L (ref 46–116)
ALT SERPL-CCNC: 89 U/L (ref 16–63)
AMPHETAMINES UR QL SCN: (no result)
AMYLASE SERPL-CCNC: 56 U/L (ref 25–115)
AST SERPL-CCNC: 25 U/L (ref 15–37)
BASOPHILS NFR BLD: 0.3 % (ref 0–2)
BILIRUB SERPL-MCNC: 0.41 MG/DL (ref 0.2–1)
BUN SERPL-MCNC: 9 MG/DL (ref 7–18)
CALCIUM SERPL-MCNC: 8.6 MG/DL (ref 8.5–10.1)
CHLORIDE SERPL-SCNC: 101 MMOL/L (ref 98–107)
CHOLEST SERPL-MCNC: 170 MG/DL (ref ?–200)
CHOLEST/HDLC SERPL: 5 MG/DL
CO2 SERPL-SCNC: 31.6 MMOL/L (ref 21–32)
CREAT SERPL-MCNC: 1 MG/DL (ref 0.7–1.3)
ERYTHROCYTE [DISTWIDTH] IN BLOOD BY AUTOMATED COUNT: 13.2 % (ref 11.5–14.5)
GFR SERPLBLD BASED ON 1.73 SQ M-ARVRAT: > 60 ML/MIN
GLUCOSE SERPL-MCNC: 114 MG/DL (ref 74–106)
HDLC SERPL-MCNC: 34 MG/DL (ref 40–60)
LIPASE SERPL-CCNC: 152 IU/L (ref 73–393)
MAGNESIUM SERPL-MCNC: 1.9 MG/DL (ref 1.8–2.4)
MICROSCOPIC UR-IMP: YES
PHOSPHATE SERPL-MCNC: 3.1 MG/DL (ref 2.5–4.9)
PLATELET # BLD: 246 X10^3MCL (ref 130–400)
POTASSIUM SERPL-SCNC: 2.9 MMOL/L (ref 3.5–5.1)
PROT SERPL-MCNC: 7 G/DL (ref 6.4–8.2)
RBC # UR STRIP.AUTO: (no result) /UL
SODIUM SERPL-SCNC: 140 MMOL/L (ref 136–145)
T3 SERPL-MCNC: 0.94 NG/ML
T3RU NFR SERPL: 37 % UPTAKE (ref 33–39)
T4 FREE SERPL-MCNC: 1.21 NG/DL (ref 0.76–1.46)
T4 SERPL-MCNC: 8 UG/DL (ref 4.7–13.3)
T4/T3 UPTAKE INDEX SERPL: 3 UG/DL (ref 1.4–4.5)
TRIGL SERPL-MCNC: 213 MG/DL (ref ?–150)
UA SPECIFIC GRAVITY: 1.02 (ref 1–1.03)

## 2018-04-18 VITALS — SYSTOLIC BLOOD PRESSURE: 142 MMHG | DIASTOLIC BLOOD PRESSURE: 69 MMHG

## 2018-04-18 VITALS — SYSTOLIC BLOOD PRESSURE: 123 MMHG | DIASTOLIC BLOOD PRESSURE: 74 MMHG

## 2018-04-18 VITALS — SYSTOLIC BLOOD PRESSURE: 137 MMHG | DIASTOLIC BLOOD PRESSURE: 88 MMHG

## 2018-04-18 VITALS — DIASTOLIC BLOOD PRESSURE: 84 MMHG | SYSTOLIC BLOOD PRESSURE: 146 MMHG

## 2018-04-18 VITALS — SYSTOLIC BLOOD PRESSURE: 154 MMHG | DIASTOLIC BLOOD PRESSURE: 91 MMHG

## 2018-04-18 LAB
BASOPHILS NFR BLD: 0.4 % (ref 0–2)
BUN SERPL-MCNC: 13 MG/DL (ref 7–18)
CALCIUM SERPL-MCNC: 9.5 MG/DL (ref 8.5–10.1)
CHLORIDE SERPL-SCNC: 102 MMOL/L (ref 98–107)
CO2 SERPL-SCNC: 27 MMOL/L (ref 21–32)
CREAT SERPL-MCNC: 1 MG/DL (ref 0.7–1.3)
ERYTHROCYTE [DISTWIDTH] IN BLOOD BY AUTOMATED COUNT: 13.8 % (ref 11.5–14.5)
GFR SERPLBLD BASED ON 1.73 SQ M-ARVRAT: > 60 ML/MIN
GLUCOSE SERPL-MCNC: 159 MG/DL (ref 74–106)
MAGNESIUM SERPL-MCNC: 1.9 MG/DL (ref 1.8–2.4)
PHOSPHATE SERPL-MCNC: 2.3 MG/DL (ref 2.5–4.9)
PLATELET # BLD: 250 X10^3MCL (ref 130–400)
POTASSIUM SERPL-SCNC: 4.7 MMOL/L (ref 3.5–5.1)
SODIUM SERPL-SCNC: 136 MMOL/L (ref 136–145)

## 2018-04-19 VITALS — SYSTOLIC BLOOD PRESSURE: 124 MMHG | DIASTOLIC BLOOD PRESSURE: 76 MMHG

## 2018-04-19 VITALS — DIASTOLIC BLOOD PRESSURE: 99 MMHG | SYSTOLIC BLOOD PRESSURE: 156 MMHG

## 2018-04-19 VITALS — DIASTOLIC BLOOD PRESSURE: 79 MMHG | SYSTOLIC BLOOD PRESSURE: 150 MMHG

## 2018-04-19 LAB
BUN SERPL-MCNC: 18 MG/DL (ref 7–18)
CALCIUM SERPL-MCNC: 8.4 MG/DL (ref 8.5–10.1)
CHLORIDE SERPL-SCNC: 104 MMOL/L (ref 98–107)
CO2 SERPL-SCNC: 27.2 MMOL/L (ref 21–32)
CREAT SERPL-MCNC: 0.9 MG/DL (ref 0.7–1.3)
ERYTHROCYTE [DISTWIDTH] IN BLOOD BY AUTOMATED COUNT: 13.8 % (ref 11.5–14.5)
GFR SERPLBLD BASED ON 1.73 SQ M-ARVRAT: > 60 ML/MIN
GLUCOSE SERPL-MCNC: 154 MG/DL (ref 74–106)
MONOCYTES NFR BLD: 2 % (ref 0–7)
NEUTS BAND NFR BLD: 2 % (ref 0–10)
NEUTS SEG NFR BLD MANUAL: 91 % (ref 37–75)
PHOSPHATE SERPL-MCNC: 2.8 MG/DL (ref 2.5–4.9)
PLATELET # BLD: 213 X10^3MCL (ref 130–400)
POTASSIUM SERPL-SCNC: 4.3 MMOL/L (ref 3.5–5.1)
RBC MORPH BLD: NORMAL
SODIUM SERPL-SCNC: 137 MMOL/L (ref 136–145)

## 2018-05-20 ENCOUNTER — HOSPITAL ENCOUNTER (EMERGENCY)
Dept: HOSPITAL 36 - ER | Age: 58
LOS: 1 days | Discharge: HOME | End: 2018-05-21
Payer: MEDICARE

## 2018-05-20 DIAGNOSIS — R10.9: ICD-10-CM

## 2018-05-20 DIAGNOSIS — R30.0: Primary | ICD-10-CM

## 2018-05-20 LAB
ALBUMIN SERPL-MCNC: 4.4 GM/DL (ref 4.2–5.5)
ALBUMIN/GLOB SERPL: 1.5 {RATIO} (ref 1–1.8)
ALP SERPL-CCNC: 192 U/L (ref 34–104)
ALT SERPL-CCNC: 45 U/L (ref 7–52)
ANION GAP SERPL CALC-SCNC: 11.3 MMOL/L (ref 7–16)
APPEARANCE UR: CLEAR
AST SERPL-CCNC: 31 U/L (ref 13–39)
BACTERIA #/AREA URNS HPF: (no result) /HPF
BASOPHILS # BLD AUTO: 0 TH/CUMM (ref 0–0.2)
BASOPHILS NFR BLD AUTO: 0.5 % (ref 0–2)
BILIRUB SERPL-MCNC: 1 MG/DL (ref 0.3–1)
BILIRUB UR-MCNC: NEGATIVE MG/DL
BUN SERPL-MCNC: 14 MG/DL (ref 7–25)
CALCIUM SERPL-MCNC: 9.6 MG/DL (ref 8.6–10.3)
CHLORIDE SERPL-SCNC: 101 MEQ/L (ref 98–107)
CO2 SERPL-SCNC: 25.2 MEQ/L (ref 21–31)
COLOR UR: YELLOW
CREAT SERPL-MCNC: 0.9 MG/DL (ref 0.7–1.3)
EOSINOPHIL # BLD AUTO: 0.1 TH/CMM (ref 0.1–0.4)
EOSINOPHIL NFR BLD AUTO: 2.8 % (ref 0–5)
EPI CELLS URNS QL MICRO: (no result) /LPF
ERYTHROCYTE [DISTWIDTH] IN BLOOD BY AUTOMATED COUNT: 13.1 % (ref 11.5–20)
GLOBULIN SER-MCNC: 2.9 GM/DL
GLUCOSE SERPL-MCNC: 100 MG/DL (ref 70–105)
GLUCOSE UR STRIP-MCNC: NEGATIVE MG/DL
HCT VFR BLD CALC: 48.9 % (ref 41–60)
HGB BLD-MCNC: 17 GM/DL (ref 12–16)
KETONES UR STRIP-MCNC: NEGATIVE MG/DL
LEUKOCYTE ESTERASE UR-ACNC: NEGATIVE
LYMPHOCYTE AB SER FC-ACNC: 2 TH/CMM (ref 1.5–3)
LYMPHOCYTES NFR BLD AUTO: 37.2 % (ref 20–50)
MCH RBC QN AUTO: 34.7 PG (ref 26–30)
MCHC RBC AUTO-ENTMCNC: 34.9 PG (ref 28–36)
MCV RBC AUTO: 99.6 FL (ref 80–99)
MICRO URNS: YES
MONOCYTES # BLD AUTO: 0.5 TH/CMM (ref 0.3–1)
MONOCYTES NFR BLD AUTO: 8.5 % (ref 2–10)
NEUTROPHILS # BLD: 2.7 TH/CMM (ref 1.8–8)
NEUTROPHILS NFR BLD AUTO: 51 % (ref 40–80)
NITRITE UR QL STRIP: NEGATIVE
PH UR STRIP: 5.5 [PH] (ref 4.6–8)
PLATELET # BLD: 314 TH/CMM (ref 150–400)
PMV BLD AUTO: 6.7 FL
POTASSIUM SERPL-SCNC: 3.5 MEQ/L (ref 3.5–5.1)
PROT UR STRIP-MCNC: NEGATIVE MG/DL
RBC # BLD AUTO: 4.91 MIL/CMM (ref 4.3–5.7)
RBC # UR STRIP: (no result) /UL
RBC #/AREA URNS HPF: (no result) /HPF (ref 0–5)
SODIUM SERPL-SCNC: 134 MEQ/L (ref 136–145)
SP GR UR STRIP: 1.02 (ref 1–1.03)
URINALYSIS COMPLETE PNL UR: (no result)
UROBILINOGEN UR STRIP-ACNC: 0.2 E.U./DL (ref 0.2–1)
WBC # BLD AUTO: 5.3 TH/CMM (ref 4.8–10.8)
WBC #/AREA URNS HPF: (no result) /HPF (ref 0–5)
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
## 2018-05-21 NOTE — DIAGNOSTIC IMAGING REPORT
CT abdomen and pelvis without intravenous contrast



Indication: Right flank pain



Comparison: CT abdomen and pelvis on 1/26/2018,



Technique: Axial images were obtained from the lung bases to the

bilateral proximal femurs without IV contrast.  Coronal reconstructions

were made.  total DLP: 412,  CTDI9



FINDINGS:



Hypoventilatory and atelectatic changes of the lung bases are noted

there is irregular density of the left lung base probably representing

areas of subsegmental atelectasis versus scarring.  This was not seen on

previous CT examination on 1/26/2018.  Evaluation of the solid organs is

limited due to lack of IV contrast.  No evidence of focal hepatic

lesions.  The patient status post cholecystectomy.  No focal splenic or

pancreatic lesions.  No focal adrenal lesions.  Multiple nonobstructive

bilateral renal calculi are noted.  No hydronephrosis.  Prostate gland

calcifications are noted.  Diverticulosis is noted without evidence of

diverticulitis.  No evidence of appendicitis.  No evidence of free fluid

or free air.  Mild atherosclerosis is noted.  Degenerative changes and

vasogenic changes of the spine are noted.



IMPRESSION:



Multiple nonobstructive bilateral renal calculi.



Diverticulosis without evidence of diverticulitis.



Evidence of prior cholecystectomy.



Irregular right basal density.  This is probably due to atelectasis. 

This was not seen on previous exam on 1/26/2018.  Other etiologies such

as infiltrate is considered much less likely.  Short-term follow-up CT

examination may be obtained if indicated.

## 2018-06-06 ENCOUNTER — HOSPITAL ENCOUNTER (EMERGENCY)
Dept: HOSPITAL 1 - ED | Age: 58
Discharge: HOME | End: 2018-06-06
Payer: COMMERCIAL

## 2018-06-06 VITALS
BODY MASS INDEX: 20.86 KG/M2 | HEIGHT: 72.01 IN | WEIGHT: 153.99 LBS | HEIGHT: 72.01 IN | BODY MASS INDEX: 20.86 KG/M2 | WEIGHT: 153.99 LBS

## 2018-06-06 VITALS — DIASTOLIC BLOOD PRESSURE: 90 MMHG | SYSTOLIC BLOOD PRESSURE: 155 MMHG
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
## 2018-06-06 DIAGNOSIS — I10: ICD-10-CM
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
## 2018-06-06 DIAGNOSIS — J44.9: ICD-10-CM

## 2018-06-06 DIAGNOSIS — Z88.5: ICD-10-CM

## 2018-06-06 DIAGNOSIS — S96.912A: Primary | ICD-10-CM

## 2018-06-06 DIAGNOSIS — S20.219A: ICD-10-CM

## 2018-06-06 DIAGNOSIS — Z88.6: ICD-10-CM
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
## 2018-06-06 DIAGNOSIS — Z90.49: ICD-10-CM

## 2018-07-13 ENCOUNTER — HOSPITAL ENCOUNTER (EMERGENCY)
Dept: HOSPITAL 4 - SED | Age: 58
LOS: 1 days | Discharge: LEFT BEFORE DISCHARGE | End: 2018-07-14
Payer: COMMERCIAL

## 2018-07-13 VITALS — HEIGHT: 72 IN | WEIGHT: 160 LBS | BODY MASS INDEX: 21.67 KG/M2

## 2018-07-13 DIAGNOSIS — Z53.21: ICD-10-CM

## 2018-07-13 DIAGNOSIS — R10.9: Primary | ICD-10-CM

## 2018-07-14 VITALS — SYSTOLIC BLOOD PRESSURE: 146 MMHG

## 2018-07-24 ENCOUNTER — HOSPITAL ENCOUNTER (EMERGENCY)
Dept: HOSPITAL 4 - SED | Age: 58
LOS: 1 days | Discharge: HOME | End: 2018-07-25
Payer: COMMERCIAL

## 2018-07-24 VITALS — WEIGHT: 160 LBS | HEIGHT: 72 IN | BODY MASS INDEX: 21.67 KG/M2

## 2018-07-24 VITALS — SYSTOLIC BLOOD PRESSURE: 163 MMHG

## 2018-07-24 DIAGNOSIS — Z79.899: ICD-10-CM

## 2018-07-24 DIAGNOSIS — Z88.6: ICD-10-CM

## 2018-07-24 DIAGNOSIS — N20.0: Primary | ICD-10-CM

## 2018-07-24 DIAGNOSIS — I10: ICD-10-CM

## 2018-07-24 DIAGNOSIS — J44.9: ICD-10-CM

## 2018-07-24 LAB
ALBUMIN SERPL BCP-MCNC: 3.9 G/DL (ref 3.4–4.8)
ALT SERPL W P-5'-P-CCNC: 51 U/L (ref 12–78)
ANION GAP SERPL CALCULATED.3IONS-SCNC: 10 MMOL/L (ref 5–15)
APPEARANCE UR: CLEAR
AST SERPL W P-5'-P-CCNC: 22 U/L (ref 10–37)
BACTERIA URNS QL MICRO: (no result) /HPF
BASOPHILS # BLD AUTO: 0 K/UL (ref 0–0.2)
BASOPHILS NFR BLD AUTO: 0.5 % (ref 0–2)
BILIRUB SERPL-MCNC: 0.6 MG/DL (ref 0–1)
BILIRUB UR QL STRIP: NEGATIVE
BUN SERPL-MCNC: 13 MG/DL (ref 8–21)
CALCIUM SERPL-MCNC: 9.2 MG/DL (ref 8.4–11)
CHLORIDE SERPL-SCNC: 106 MMOL/L (ref 98–107)
COLOR UR: YELLOW
CREAT SERPL-MCNC: 0.98 MG/DL (ref 0.55–1.3)
EOSINOPHIL # BLD AUTO: 0.4 K/UL (ref 0–0.4)
EOSINOPHIL NFR BLD AUTO: 7.7 % (ref 0–4)
ERYTHROCYTE [DISTWIDTH] IN BLOOD BY AUTOMATED COUNT: 11.8 % (ref 9–15)
GFR SERPL CREATININE-BSD FRML MDRD: 101 ML/MIN (ref 90–?)
GLUCOSE SERPL-MCNC: 101 MG/DL (ref 70–99)
GLUCOSE UR STRIP-MCNC: NEGATIVE MG/DL
HCT VFR BLD AUTO: 49.2 % (ref 36–54)
HGB BLD-MCNC: 16.9 G/DL (ref 14–18)
HGB UR QL STRIP: (no result)
HYALINE CASTS URNS QL MICRO: (no result) /LPF
INR PPP: 1 (ref 0.8–1.2)
KETONES UR STRIP-MCNC: NEGATIVE MG/DL
LEUKOCYTE ESTERASE UR QL STRIP: NEGATIVE
LIPASE SERPL-CCNC: 251 U/L (ref 73–393)
LYMPHOCYTES # BLD AUTO: 1.8 K/UL (ref 1–5.5)
LYMPHOCYTES NFR BLD AUTO: 31 % (ref 20.5–51.5)
MCH RBC QN AUTO: 35 PG (ref 27–31)
MCHC RBC AUTO-ENTMCNC: 34 % (ref 32–36)
MCV RBC AUTO: 102 FL (ref 79–98)
MONOCYTES # BLD MANUAL: 0.6 K/UL (ref 0–1)
MONOCYTES # BLD MANUAL: 11.1 % (ref 1.7–9.3)
NEUTROPHILS # BLD AUTO: 2.9 K/UL (ref 1.8–7.7)
NEUTROPHILS NFR BLD AUTO: 49.7 % (ref 40–70)
NITRITE UR QL STRIP: NEGATIVE
PH UR STRIP: 6 [PH] (ref 5–8)
PLATELET # BLD AUTO: 226 K/UL (ref 130–430)
POTASSIUM SERPL-SCNC: 3.4 MMOL/L (ref 3.5–5.1)
PROT UR QL STRIP: (no result)
PROTHROMBIN TIME: 10.1 SECS (ref 9.5–12.5)
RBC # BLD AUTO: 4.84 MIL/UL (ref 4.2–6.2)
SODIUM SERPLBLD-SCNC: 143 MMOL/L (ref 136–145)
SP GR UR STRIP: >=1.03 (ref 1–1.03)
UROBILINOGEN UR STRIP-MCNC: 0.2 MG/DL (ref 0.2–1)
WBC # BLD AUTO: 5.7 K/UL (ref 4.8–10.8)
WBC #/AREA URNS HPF: (no result) /HPF (ref 0–3)

## 2018-07-24 PROCEDURE — 81000 URINALYSIS NONAUTO W/SCOPE: CPT

## 2018-07-24 PROCEDURE — 99285 EMERGENCY DEPT VISIT HI MDM: CPT

## 2018-07-24 PROCEDURE — 80053 COMPREHEN METABOLIC PANEL: CPT

## 2018-07-24 PROCEDURE — 36415 COLL VENOUS BLD VENIPUNCTURE: CPT

## 2018-07-24 PROCEDURE — 85025 COMPLETE CBC W/AUTO DIFF WBC: CPT

## 2018-07-24 PROCEDURE — 96375 TX/PRO/DX INJ NEW DRUG ADDON: CPT

## 2018-07-24 PROCEDURE — 85730 THROMBOPLASTIN TIME PARTIAL: CPT

## 2018-07-24 PROCEDURE — 74176 CT ABD & PELVIS W/O CONTRAST: CPT

## 2018-07-24 PROCEDURE — 85610 PROTHROMBIN TIME: CPT

## 2018-07-24 PROCEDURE — 83690 ASSAY OF LIPASE: CPT

## 2018-07-24 PROCEDURE — 96374 THER/PROPH/DIAG INJ IV PUSH: CPT

## 2018-07-25 VITALS — SYSTOLIC BLOOD PRESSURE: 154 MMHG

## 2018-08-18 ENCOUNTER — HOSPITAL ENCOUNTER (EMERGENCY)
Dept: HOSPITAL 1 - ED | Age: 58
LOS: 1 days | Discharge: HOME | End: 2018-08-19
Payer: COMMERCIAL

## 2018-08-18 VITALS
HEIGHT: 72 IN | BODY MASS INDEX: 21.54 KG/M2 | WEIGHT: 159 LBS | HEIGHT: 72 IN | BODY MASS INDEX: 21.54 KG/M2 | WEIGHT: 159 LBS

## 2018-08-18 DIAGNOSIS — N23: Primary | ICD-10-CM

## 2018-08-19 VITALS — DIASTOLIC BLOOD PRESSURE: 89 MMHG | SYSTOLIC BLOOD PRESSURE: 121 MMHG

## 2018-08-19 LAB
ALBUMIN SERPL-MCNC: 3.7 G/DL (ref 3.4–5)
ALP SERPL-CCNC: 171 U/L (ref 46–116)
ALT SERPL-CCNC: 48 U/L (ref 16–63)
AST SERPL-CCNC: 18 U/L (ref 15–37)
BASOPHILS NFR BLD: 0.5 % (ref 0–2)
BILIRUB SERPL-MCNC: 0.3 MG/DL (ref 0.2–1)
BUN SERPL-MCNC: 15 MG/DL (ref 7–18)
CALCIUM SERPL-MCNC: 9 MG/DL (ref 8.5–10.1)
CHLORIDE SERPL-SCNC: 106 MMOL/L (ref 98–107)
CO2 SERPL-SCNC: 26.3 MMOL/L (ref 21–32)
CREAT SERPL-MCNC: 0.9 MG/DL (ref 0.7–1.3)
ERYTHROCYTE [DISTWIDTH] IN BLOOD BY AUTOMATED COUNT: 12.8 % (ref 11.5–14.5)
GFR SERPLBLD BASED ON 1.73 SQ M-ARVRAT: > 60 ML/MIN
GLUCOSE SERPL-MCNC: 89 MG/DL (ref 74–106)
LIPASE SERPL-CCNC: 217 IU/L (ref 73–393)
PLATELET # BLD: 215 X10^3MCL (ref 130–400)
POTASSIUM SERPL-SCNC: 3.4 MMOL/L (ref 3.5–5.1)
PROT SERPL-MCNC: 6.8 G/DL (ref 6.4–8.2)
SODIUM SERPL-SCNC: 139 MMOL/L (ref 136–145)

## 2018-08-25 ENCOUNTER — HOSPITAL ENCOUNTER (EMERGENCY)
Dept: HOSPITAL 36 - ER | Age: 58
Discharge: HOME | End: 2018-08-25
Payer: MEDICARE

## 2018-08-25 DIAGNOSIS — K13.29: ICD-10-CM

## 2018-08-25 DIAGNOSIS — F17.210: ICD-10-CM

## 2018-08-25 DIAGNOSIS — R10.9: Primary | ICD-10-CM

## 2018-08-25 DIAGNOSIS — R20.0: ICD-10-CM

## 2018-08-25 DIAGNOSIS — J44.9: ICD-10-CM

## 2018-08-25 DIAGNOSIS — Z88.6: ICD-10-CM

## 2018-08-25 DIAGNOSIS — G89.29: ICD-10-CM

## 2018-08-25 DIAGNOSIS — Z88.5: ICD-10-CM

## 2018-08-25 DIAGNOSIS — Z72.89: ICD-10-CM

## 2018-08-25 LAB
AMPHET UR-MCNC: NEGATIVE NG/ML
APPEARANCE UR: CLEAR
BACTERIA #/AREA URNS HPF: (no result) /HPF
BARBITURATES UR-MCNC: NEGATIVE UG/ML
BENZODIAZEPINES PNL UR: NEGATIVE
BILIRUB UR-MCNC: NEGATIVE MG/DL
CANNABINOIDS SERPL QL CFM: NEGATIVE
COCAINE METAB.OTHER UR-MCNC: NEGATIVE NG/ML
COLOR UR: YELLOW
EPI CELLS URNS QL MICRO: (no result) /LPF
GLUCOSE UR STRIP-MCNC: NEGATIVE MG/DL
KETONES UR STRIP-MCNC: NEGATIVE MG/DL
LEUKOCYTE ESTERASE UR-ACNC: NEGATIVE
METHADONE UR CFM-MCNC: NEGATIVE NG/ML
METHAMPHET UR QL: NEGATIVE
MICRO URNS: YES
NITRITE UR QL STRIP: NEGATIVE
OPIATES UR QL: NEGATIVE
PCP UR-MCNC: NEGATIVE UG/L
PH UR STRIP: 5.5 [PH] (ref 4.6–8)
PROT UR STRIP-MCNC: NEGATIVE MG/DL
RBC # UR STRIP: (no result) /UL
RBC #/AREA URNS HPF: (no result) /HPF (ref 0–5)
SP GR UR STRIP: 1.02 (ref 1–1.03)
TRICYCLICS UR QL: NEGATIVE
URINALYSIS COMPLETE PNL UR: (no result)
UROBILINOGEN UR STRIP-ACNC: 0.2 E.U./DL (ref 0.2–1)
WBC #/AREA URNS HPF: (no result) /HPF (ref 0–5)

## 2018-08-25 PROCEDURE — 94640 AIRWAY INHALATION TREATMENT: CPT

## 2018-08-25 PROCEDURE — 99284 EMERGENCY DEPT VISIT MOD MDM: CPT

## 2018-08-25 PROCEDURE — Z7502: HCPCS

## 2018-08-25 PROCEDURE — 80307 DRUG TEST PRSMV CHEM ANLYZR: CPT

## 2018-08-25 PROCEDURE — 81001 URINALYSIS AUTO W/SCOPE: CPT

## 2018-08-25 PROCEDURE — 96372 THER/PROPH/DIAG INJ SC/IM: CPT
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
## 2018-09-25 ENCOUNTER — HOSPITAL ENCOUNTER (EMERGENCY)
Dept: HOSPITAL 4 - SED | Age: 58
Discharge: HOME | End: 2018-09-25
Payer: COMMERCIAL

## 2018-09-25 ENCOUNTER — HOSPITAL ENCOUNTER (EMERGENCY)
Dept: HOSPITAL 1 - ED | Age: 58
Discharge: HOME | End: 2018-09-25
Payer: COMMERCIAL

## 2018-09-25 VITALS — DIASTOLIC BLOOD PRESSURE: 58 MMHG | SYSTOLIC BLOOD PRESSURE: 137 MMHG

## 2018-09-25 VITALS — WEIGHT: 160 LBS | HEIGHT: 72 IN | BODY MASS INDEX: 21.67 KG/M2

## 2018-09-25 VITALS — SYSTOLIC BLOOD PRESSURE: 155 MMHG

## 2018-09-25 VITALS — SYSTOLIC BLOOD PRESSURE: 142 MMHG

## 2018-09-25 DIAGNOSIS — J42: Primary | ICD-10-CM

## 2018-09-25 DIAGNOSIS — Z90.49: ICD-10-CM

## 2018-09-25 DIAGNOSIS — Z88.6: ICD-10-CM

## 2018-09-25 DIAGNOSIS — F17.210: ICD-10-CM

## 2018-09-25 DIAGNOSIS — Z79.899: ICD-10-CM

## 2018-09-25 DIAGNOSIS — Z88.8: ICD-10-CM

## 2018-09-25 DIAGNOSIS — I10: ICD-10-CM

## 2018-09-25 DIAGNOSIS — Z98.890: ICD-10-CM

## 2018-09-25 DIAGNOSIS — Z87.442: ICD-10-CM

## 2018-09-25 DIAGNOSIS — J44.9: Primary | ICD-10-CM

## 2018-09-25 PROCEDURE — 99284 EMERGENCY DEPT VISIT MOD MDM: CPT

## 2018-09-25 PROCEDURE — 71045 X-RAY EXAM CHEST 1 VIEW: CPT

## 2018-09-25 PROCEDURE — 94640 AIRWAY INHALATION TREATMENT: CPT

## 2018-11-10 ENCOUNTER — HOSPITAL ENCOUNTER (EMERGENCY)
Dept: HOSPITAL 36 - ER | Age: 58
Discharge: HOME | End: 2018-11-10
Payer: MEDICARE

## 2018-11-10 DIAGNOSIS — F17.200: ICD-10-CM

## 2018-11-10 DIAGNOSIS — Z87.442: ICD-10-CM

## 2018-11-10 DIAGNOSIS — Z88.6: ICD-10-CM

## 2018-11-10 DIAGNOSIS — J44.9: Primary | ICD-10-CM

## 2018-11-10 DIAGNOSIS — I10: ICD-10-CM

## 2018-11-10 DIAGNOSIS — Z88.5: ICD-10-CM

## 2018-11-10 LAB
ALBUMIN SERPL-MCNC: 4.2 GM/DL (ref 4.2–5.5)
ALBUMIN/GLOB SERPL: 1.6 {RATIO} (ref 1–1.8)
ALP SERPL-CCNC: 119 U/L (ref 34–104)
ALT SERPL-CCNC: 15 U/L (ref 7–52)
AMPHET UR-MCNC: NEGATIVE NG/ML
ANION GAP SERPL CALC-SCNC: 9.8 MMOL/L (ref 7–16)
APPEARANCE UR: CLEAR
AST SERPL-CCNC: 18 U/L (ref 13–39)
BACTERIA #/AREA URNS HPF: (no result) /HPF
BARBITURATES UR-MCNC: NEGATIVE UG/ML
BASOPHILS # BLD AUTO: 0 TH/CUMM (ref 0–0.2)
BASOPHILS NFR BLD AUTO: 0.7 % (ref 0–2)
BENZODIAZEPINES PNL UR: NEGATIVE
BILIRUB SERPL-MCNC: 0.4 MG/DL (ref 0.3–1)
BILIRUB UR-MCNC: NEGATIVE MG/DL
BUN SERPL-MCNC: 13 MG/DL (ref 7–25)
CALCIUM SERPL-MCNC: 9.5 MG/DL (ref 8.6–10.3)
CANNABINOIDS SERPL QL CFM: NEGATIVE
CHLORIDE SERPL-SCNC: 105 MEQ/L (ref 98–107)
CHOLEST SERPL-MCNC: 205 MG/DL (ref ?–200)
CO2 SERPL-SCNC: 27.3 MEQ/L (ref 21–31)
COCAINE METAB.OTHER UR-MCNC: NEGATIVE NG/ML
COLOR UR: YELLOW
CREAT SERPL-MCNC: 0.9 MG/DL (ref 0.7–1.3)
EOSINOPHIL # BLD AUTO: 0.2 TH/CMM (ref 0.1–0.4)
EOSINOPHIL NFR BLD AUTO: 3.6 % (ref 0–5)
EPI CELLS URNS QL MICRO: (no result) /LPF
ERYTHROCYTE [DISTWIDTH] IN BLOOD BY AUTOMATED COUNT: 13 % (ref 11.5–20)
GLOBULIN SER-MCNC: 2.7 GM/DL
GLUCOSE SERPL-MCNC: 93 MG/DL (ref 70–105)
GLUCOSE UR STRIP-MCNC: NEGATIVE MG/DL
HCT VFR BLD CALC: 49.3 % (ref 41–60)
HDLC SERPL-MCNC: 44 MG/DL (ref 23–92)
HGB BLD-MCNC: 16.3 GM/DL (ref 12–16)
INR PPP: 0.95 (ref 0.5–1.4)
KETONES UR STRIP-MCNC: NEGATIVE MG/DL
LEUKOCYTE ESTERASE UR-ACNC: NEGATIVE
LYMPHOCYTE AB SER FC-ACNC: 1.8 TH/CMM (ref 1.5–3)
LYMPHOCYTES NFR BLD AUTO: 29.3 % (ref 20–50)
MCH RBC QN AUTO: 33.3 PG (ref 26–30)
MCHC RBC AUTO-ENTMCNC: 33 PG (ref 28–36)
MCV RBC AUTO: 100.8 FL (ref 80–99)
METHADONE UR CFM-MCNC: NEGATIVE NG/ML
METHAMPHET UR QL: NEGATIVE
MICRO URNS: YES
MONOCYTES # BLD AUTO: 0.5 TH/CMM (ref 0.3–1)
MONOCYTES NFR BLD AUTO: 9 % (ref 2–10)
NEUTROPHILS # BLD: 3.5 TH/CMM (ref 1.8–8)
NEUTROPHILS NFR BLD AUTO: 57.4 % (ref 40–80)
NITRITE UR QL STRIP: NEGATIVE
OPIATES UR QL: NEGATIVE
PCO2 BLDA: 42 MMHG (ref 35–45)
PCP UR-MCNC: NEGATIVE UG/L
PH UR STRIP: 6.5 [PH] (ref 4.6–8)
PLATELET # BLD: 215 TH/CMM (ref 150–400)
PMV BLD AUTO: 7.2 FL
PO2 BLDA: 77 MMHG (ref 80–100)
POTASSIUM SERPL-SCNC: 4.1 MEQ/L (ref 3.5–5.1)
PROT UR STRIP-MCNC: NEGATIVE MG/DL
PROTHROMBIN TIME: 9.6 SECONDS (ref 9.5–11.5)
RBC # BLD AUTO: 4.89 MIL/CMM (ref 4.3–5.7)
RBC # UR STRIP: (no result) /UL
RBC #/AREA URNS HPF: (no result) /HPF (ref 0–5)
SAO2 % BLDA: 96 % (ref 92–100)
SODIUM SERPL-SCNC: 138 MEQ/L (ref 136–145)
SP GR UR STRIP: 1.02 (ref 1–1.03)
TRICYCLICS UR QL: NEGATIVE
TRIGL SERPL-MCNC: 168 MG/DL (ref ?–150)
URINALYSIS COMPLETE PNL UR: (no result)
UROBILINOGEN UR STRIP-ACNC: 0.2 E.U./DL (ref 0.2–1)
WBC # BLD AUTO: 6 TH/CMM (ref 4.8–10.8)
WBC #/AREA URNS HPF: (no result) /HPF (ref 0–5)

## 2018-11-10 PROCEDURE — 84484 ASSAY OF TROPONIN QUANT: CPT

## 2018-11-10 PROCEDURE — 93005 ELECTROCARDIOGRAM TRACING: CPT

## 2018-11-10 PROCEDURE — 94640 AIRWAY INHALATION TREATMENT: CPT

## 2018-11-10 PROCEDURE — 85730 THROMBOPLASTIN TIME PARTIAL: CPT

## 2018-11-10 PROCEDURE — 99285 EMERGENCY DEPT VISIT HI MDM: CPT

## 2018-11-10 PROCEDURE — 96374 THER/PROPH/DIAG INJ IV PUSH: CPT

## 2018-11-10 PROCEDURE — 85610 PROTHROMBIN TIME: CPT

## 2018-11-10 PROCEDURE — 36600 WITHDRAWAL OF ARTERIAL BLOOD: CPT

## 2018-11-10 PROCEDURE — 85025 COMPLETE CBC W/AUTO DIFF WBC: CPT

## 2018-11-10 PROCEDURE — 81001 URINALYSIS AUTO W/SCOPE: CPT

## 2018-11-10 PROCEDURE — 71045 X-RAY EXAM CHEST 1 VIEW: CPT

## 2018-11-10 PROCEDURE — 80307 DRUG TEST PRSMV CHEM ANLYZR: CPT

## 2018-11-10 PROCEDURE — 36415 COLL VENOUS BLD VENIPUNCTURE: CPT

## 2018-11-10 PROCEDURE — 80053 COMPREHEN METABOLIC PANEL: CPT

## 2018-11-10 PROCEDURE — Z7610: HCPCS

## 2018-11-10 PROCEDURE — 82803 BLOOD GASES ANY COMBINATION: CPT

## 2018-11-10 PROCEDURE — 85379 FIBRIN DEGRADATION QUANT: CPT

## 2018-11-10 PROCEDURE — 84443 ASSAY THYROID STIM HORMONE: CPT

## 2018-11-10 PROCEDURE — 80061 LIPID PANEL: CPT
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
## 2018-11-11 NOTE — DIAGNOSTIC IMAGING REPORT
CHEST X-RAY: AP view



INDICATION: Shortness of breath



COMPARISON: 4/4/2017



FINDINGS: Hyperinflated lungs are seen with COPD changes.  Slight

increased right mid lung markings are noted.  No focal consolidation or

effusions.  Heart size is normal.  Degenerative changes of the spine are

noted.



IMPRESSION: Hyperinflated lungs with COPD changes.  Slight increased

right mid lung markings are seen which may be chronic.  Faint infiltrate

is less likely.  Please correlate with clinical findings.  Otherwise no

focal consolidation is identified.

## 2019-01-05 ENCOUNTER — HOSPITAL ENCOUNTER (EMERGENCY)
Dept: HOSPITAL 4 - SED | Age: 59
Discharge: HOME | End: 2019-01-05
Payer: COMMERCIAL

## 2019-01-05 VITALS — SYSTOLIC BLOOD PRESSURE: 155 MMHG

## 2019-01-05 VITALS — HEIGHT: 72 IN | WEIGHT: 160 LBS | BODY MASS INDEX: 21.67 KG/M2

## 2019-01-05 DIAGNOSIS — R10.13: Primary | ICD-10-CM

## 2019-01-05 DIAGNOSIS — Z87.442: ICD-10-CM

## 2019-01-05 DIAGNOSIS — Z79.899: ICD-10-CM

## 2019-01-05 DIAGNOSIS — I10: ICD-10-CM

## 2019-01-05 DIAGNOSIS — J44.9: ICD-10-CM

## 2019-01-05 DIAGNOSIS — Z88.6: ICD-10-CM

## 2019-01-05 LAB
ALBUMIN SERPL BCP-MCNC: 3.9 G/DL (ref 3.4–4.8)
ALT SERPL W P-5'-P-CCNC: 31 U/L (ref 12–78)
ANION GAP SERPL CALCULATED.3IONS-SCNC: 9 MMOL/L (ref 5–15)
APPEARANCE UR: CLEAR
AST SERPL W P-5'-P-CCNC: 21 U/L (ref 10–37)
BASOPHILS # BLD AUTO: 0 K/UL (ref 0–0.2)
BASOPHILS NFR BLD AUTO: 0.4 % (ref 0–2)
BILIRUB SERPL-MCNC: 0.2 MG/DL (ref 0–1)
BILIRUB UR QL STRIP: NEGATIVE
BUN SERPL-MCNC: 9 MG/DL (ref 8–21)
CALCIUM SERPL-MCNC: 9.3 MG/DL (ref 8.4–11)
CHLORIDE SERPL-SCNC: 105 MMOL/L (ref 98–107)
COLOR UR: YELLOW
CREAT SERPL-MCNC: 0.99 MG/DL (ref 0.55–1.3)
EOSINOPHIL # BLD AUTO: 0.3 K/UL (ref 0–0.4)
EOSINOPHIL NFR BLD AUTO: 5 % (ref 0–4)
ERYTHROCYTE [DISTWIDTH] IN BLOOD BY AUTOMATED COUNT: 12.6 % (ref 9–15)
GFR SERPL CREATININE-BSD FRML MDRD: 100 ML/MIN (ref 90–?)
GLUCOSE SERPL-MCNC: 86 MG/DL (ref 70–99)
GLUCOSE UR STRIP-MCNC: NEGATIVE MG/DL
HCT VFR BLD AUTO: 52 % (ref 36–54)
HGB BLD-MCNC: 17.2 G/DL (ref 14–18)
HGB UR QL STRIP: NEGATIVE
KETONES UR STRIP-MCNC: NEGATIVE MG/DL
LEUKOCYTE ESTERASE UR QL STRIP: NEGATIVE
LIPASE SERPL-CCNC: 184 U/L (ref 73–393)
LYMPHOCYTES # BLD AUTO: 1.5 K/UL (ref 1–5.5)
LYMPHOCYTES NFR BLD AUTO: 26.8 % (ref 20.5–51.5)
MCH RBC QN AUTO: 33 PG (ref 27–31)
MCHC RBC AUTO-ENTMCNC: 33 % (ref 32–36)
MCV RBC AUTO: 100 FL (ref 79–98)
MONOCYTES # BLD MANUAL: 0.5 K/UL (ref 0–1)
MONOCYTES # BLD MANUAL: 8.8 % (ref 1.7–9.3)
NEUTROPHILS # BLD AUTO: 3.2 K/UL (ref 1.8–7.7)
NEUTROPHILS NFR BLD AUTO: 59 % (ref 40–70)
NITRITE UR QL STRIP: NEGATIVE
PH UR STRIP: 5.5 [PH] (ref 5–8)
PLATELET # BLD AUTO: 208 K/UL (ref 130–430)
POTASSIUM SERPL-SCNC: 4.1 MMOL/L (ref 3.5–5.1)
PROT UR QL STRIP: NEGATIVE
RBC # BLD AUTO: 5.19 MIL/UL (ref 4.2–6.2)
SODIUM SERPLBLD-SCNC: 141 MMOL/L (ref 136–145)
SP GR UR STRIP: 1 (ref 1–1.03)
UROBILINOGEN UR STRIP-MCNC: 0.2 MG/DL (ref 0.2–1)
WBC # BLD AUTO: 5.5 K/UL (ref 4.8–10.8)

## 2019-01-05 PROCEDURE — 81003 URINALYSIS AUTO W/O SCOPE: CPT

## 2019-01-05 PROCEDURE — 83690 ASSAY OF LIPASE: CPT

## 2019-01-05 PROCEDURE — 36415 COLL VENOUS BLD VENIPUNCTURE: CPT

## 2019-01-05 PROCEDURE — 80053 COMPREHEN METABOLIC PANEL: CPT

## 2019-01-05 PROCEDURE — 85025 COMPLETE CBC W/AUTO DIFF WBC: CPT

## 2019-01-05 PROCEDURE — 96372 THER/PROPH/DIAG INJ SC/IM: CPT

## 2019-01-05 PROCEDURE — 99283 EMERGENCY DEPT VISIT LOW MDM: CPT

## 2019-03-07 ENCOUNTER — HOSPITAL ENCOUNTER (EMERGENCY)
Dept: HOSPITAL 4 - SED | Age: 59
Discharge: HOME | End: 2019-03-07
Payer: COMMERCIAL

## 2019-03-07 VITALS — HEIGHT: 72 IN | BODY MASS INDEX: 21.67 KG/M2 | WEIGHT: 160 LBS

## 2019-03-07 VITALS — SYSTOLIC BLOOD PRESSURE: 128 MMHG

## 2019-03-07 DIAGNOSIS — Z90.49: ICD-10-CM

## 2019-03-07 DIAGNOSIS — Z79.899: ICD-10-CM

## 2019-03-07 DIAGNOSIS — I10: ICD-10-CM

## 2019-03-07 DIAGNOSIS — J44.9: ICD-10-CM

## 2019-03-07 DIAGNOSIS — R07.89: Primary | ICD-10-CM

## 2019-03-07 DIAGNOSIS — Z88.6: ICD-10-CM

## 2019-03-07 PROCEDURE — 71046 X-RAY EXAM CHEST 2 VIEWS: CPT

## 2019-03-07 PROCEDURE — 99283 EMERGENCY DEPT VISIT LOW MDM: CPT

## 2019-03-07 PROCEDURE — 96372 THER/PROPH/DIAG INJ SC/IM: CPT

## 2019-03-17 ENCOUNTER — HOSPITAL ENCOUNTER (EMERGENCY)
Dept: HOSPITAL 1 - ED | Age: 59
Discharge: HOME | End: 2019-03-17
Payer: COMMERCIAL

## 2019-03-17 VITALS
HEIGHT: 72 IN | BODY MASS INDEX: 21.82 KG/M2 | WEIGHT: 161.12 LBS | BODY MASS INDEX: 21.82 KG/M2 | HEIGHT: 72 IN | WEIGHT: 161.12 LBS

## 2019-03-17 VITALS — DIASTOLIC BLOOD PRESSURE: 94 MMHG | SYSTOLIC BLOOD PRESSURE: 145 MMHG

## 2019-03-17 DIAGNOSIS — I10: ICD-10-CM

## 2019-03-17 DIAGNOSIS — J44.9: ICD-10-CM

## 2019-03-17 DIAGNOSIS — Z88.6: ICD-10-CM

## 2019-03-17 DIAGNOSIS — Z90.49: ICD-10-CM

## 2019-03-17 DIAGNOSIS — Z87.442: ICD-10-CM

## 2019-03-17 DIAGNOSIS — R10.9: Primary | ICD-10-CM

## 2019-04-07 ENCOUNTER — HOSPITAL ENCOUNTER (EMERGENCY)
Dept: HOSPITAL 4 - SED | Age: 59
LOS: 1 days | Discharge: HOME | End: 2019-04-08
Payer: COMMERCIAL

## 2019-04-07 VITALS — BODY MASS INDEX: 21.67 KG/M2 | WEIGHT: 160 LBS | HEIGHT: 72 IN

## 2019-04-07 VITALS — SYSTOLIC BLOOD PRESSURE: 177 MMHG

## 2019-04-07 DIAGNOSIS — Z88.8: ICD-10-CM

## 2019-04-07 DIAGNOSIS — N20.0: Primary | ICD-10-CM

## 2019-04-07 DIAGNOSIS — J44.9: ICD-10-CM

## 2019-04-07 DIAGNOSIS — Z79.899: ICD-10-CM

## 2019-04-07 DIAGNOSIS — I10: ICD-10-CM

## 2019-04-07 LAB
APPEARANCE UR: CLEAR
BACTERIA URNS QL MICRO: (no result) /HPF
BILIRUB UR QL STRIP: NEGATIVE
COLOR UR: YELLOW
GLUCOSE UR STRIP-MCNC: NEGATIVE MG/DL
HGB UR QL STRIP: (no result)
KETONES UR STRIP-MCNC: NEGATIVE MG/DL
LEUKOCYTE ESTERASE UR QL STRIP: NEGATIVE
NITRITE UR QL STRIP: NEGATIVE
PH UR STRIP: 6.5 [PH] (ref 5–8)
PROT UR QL STRIP: NEGATIVE
RBC #/AREA URNS HPF: (no result) /HPF (ref 0–3)
SP GR UR STRIP: 1.02 (ref 1–1.03)
UROBILINOGEN UR STRIP-MCNC: 0.2 MG/DL (ref 0.2–1)
WBC #/AREA URNS HPF: (no result) /HPF (ref 0–3)

## 2019-04-07 PROCEDURE — 74176 CT ABD & PELVIS W/O CONTRAST: CPT

## 2019-04-07 PROCEDURE — 96376 TX/PRO/DX INJ SAME DRUG ADON: CPT

## 2019-04-07 PROCEDURE — 99284 EMERGENCY DEPT VISIT MOD MDM: CPT

## 2019-04-07 PROCEDURE — 81000 URINALYSIS NONAUTO W/SCOPE: CPT

## 2019-04-07 PROCEDURE — 96374 THER/PROPH/DIAG INJ IV PUSH: CPT

## 2019-04-08 VITALS — SYSTOLIC BLOOD PRESSURE: 135 MMHG

## 2019-05-06 ENCOUNTER — HOSPITAL ENCOUNTER (EMERGENCY)
Dept: HOSPITAL 1 - ED | Age: 59
Discharge: HOME | End: 2019-05-06
Payer: COMMERCIAL

## 2019-05-15 ENCOUNTER — HOSPITAL ENCOUNTER (EMERGENCY)
Dept: HOSPITAL 4 - SED | Age: 59
Discharge: HOME | End: 2019-05-15
Payer: COMMERCIAL

## 2019-05-15 VITALS — SYSTOLIC BLOOD PRESSURE: 142 MMHG

## 2019-05-15 VITALS — BODY MASS INDEX: 21.67 KG/M2 | WEIGHT: 160 LBS | HEIGHT: 72 IN

## 2019-05-15 VITALS — SYSTOLIC BLOOD PRESSURE: 143 MMHG

## 2019-05-15 DIAGNOSIS — Z87.442: ICD-10-CM

## 2019-05-15 DIAGNOSIS — N23: Primary | ICD-10-CM

## 2019-05-15 DIAGNOSIS — Z90.49: ICD-10-CM

## 2019-05-15 DIAGNOSIS — Z79.899: ICD-10-CM

## 2019-05-15 DIAGNOSIS — J44.9: ICD-10-CM

## 2019-05-15 DIAGNOSIS — Z88.6: ICD-10-CM

## 2019-05-15 DIAGNOSIS — I10: ICD-10-CM

## 2019-05-15 LAB
ALBUMIN SERPL BCP-MCNC: 3.8 G/DL (ref 3.4–4.8)
ALT SERPL W P-5'-P-CCNC: 63 U/L (ref 12–78)
ANION GAP SERPL CALCULATED.3IONS-SCNC: 8 MMOL/L (ref 5–15)
APPEARANCE UR: CLEAR
AST SERPL W P-5'-P-CCNC: 26 U/L (ref 10–37)
BACTERIA URNS QL MICRO: (no result) /HPF
BASOPHILS # BLD AUTO: 0 K/UL (ref 0–0.2)
BASOPHILS NFR BLD AUTO: 0.2 % (ref 0–2)
BILIRUB SERPL-MCNC: 0.6 MG/DL (ref 0–1)
BILIRUB UR QL STRIP: NEGATIVE
BUN SERPL-MCNC: 15 MG/DL (ref 8–21)
CALCIUM SERPL-MCNC: 9.8 MG/DL (ref 8.4–11)
CHLORIDE SERPL-SCNC: 104 MMOL/L (ref 98–107)
COLOR UR: YELLOW
CREAT SERPL-MCNC: 0.95 MG/DL (ref 0.55–1.3)
EOSINOPHIL # BLD AUTO: 0 K/UL (ref 0–0.4)
EOSINOPHIL NFR BLD AUTO: 0.1 % (ref 0–4)
ERYTHROCYTE [DISTWIDTH] IN BLOOD BY AUTOMATED COUNT: 13.2 % (ref 9–15)
GFR SERPL CREATININE-BSD FRML MDRD: 105 ML/MIN (ref 90–?)
GLUCOSE SERPL-MCNC: 85 MG/DL (ref 70–99)
GLUCOSE UR STRIP-MCNC: NEGATIVE MG/DL
HCT VFR BLD AUTO: 45.8 % (ref 36–54)
HGB BLD-MCNC: 15.5 G/DL (ref 14–18)
HGB UR QL STRIP: (no result)
KETONES UR STRIP-MCNC: NEGATIVE MG/DL
LEUKOCYTE ESTERASE UR QL STRIP: NEGATIVE
LYMPHOCYTES # BLD AUTO: 1 K/UL (ref 1–5.5)
LYMPHOCYTES NFR BLD AUTO: 15.7 % (ref 20.5–51.5)
MCH RBC QN AUTO: 35 PG (ref 27–31)
MCHC RBC AUTO-ENTMCNC: 34 % (ref 32–36)
MCV RBC AUTO: 102 FL (ref 79–98)
MONOCYTES # BLD MANUAL: 0.5 K/UL (ref 0–1)
MONOCYTES # BLD MANUAL: 8 % (ref 1.7–9.3)
NEUTROPHILS # BLD AUTO: 4.8 K/UL (ref 1.8–7.7)
NEUTROPHILS NFR BLD AUTO: 76 % (ref 40–70)
NITRITE UR QL STRIP: NEGATIVE
PH UR STRIP: 5.5 [PH] (ref 5–8)
PLATELET # BLD AUTO: 221 K/UL (ref 130–430)
POTASSIUM SERPL-SCNC: 3.6 MMOL/L (ref 3.5–5.1)
PROT UR QL STRIP: NEGATIVE
RBC # BLD AUTO: 4.5 MIL/UL (ref 4.2–6.2)
SODIUM SERPLBLD-SCNC: 143 MMOL/L (ref 136–145)
SP GR UR STRIP: 1.02 (ref 1–1.03)
UROBILINOGEN UR STRIP-MCNC: 0.2 MG/DL (ref 0.2–1)
WBC # BLD AUTO: 6.3 K/UL (ref 4.8–10.8)
WBC #/AREA URNS HPF: (no result) /HPF (ref 0–3)

## 2019-05-15 PROCEDURE — 85025 COMPLETE CBC W/AUTO DIFF WBC: CPT

## 2019-05-15 PROCEDURE — 36415 COLL VENOUS BLD VENIPUNCTURE: CPT

## 2019-05-15 PROCEDURE — 74176 CT ABD & PELVIS W/O CONTRAST: CPT

## 2019-05-15 PROCEDURE — 96374 THER/PROPH/DIAG INJ IV PUSH: CPT

## 2019-05-15 PROCEDURE — 80053 COMPREHEN METABOLIC PANEL: CPT

## 2019-05-15 PROCEDURE — 99284 EMERGENCY DEPT VISIT MOD MDM: CPT

## 2019-05-15 PROCEDURE — 81000 URINALYSIS NONAUTO W/SCOPE: CPT

## 2019-05-15 PROCEDURE — 96375 TX/PRO/DX INJ NEW DRUG ADDON: CPT

## 2024-10-29 NOTE — GENERAL PROGRESS NOTE
Stable. Continue current treatment regimen.     Subjective





- Review of Systems


Service Date: 01/21/17


Subjective: 


I want more pain medication





Objective





- Results


Result Diagrams: 


 01/21/17 07:00





 01/21/17 07:00


Recent Labs: 


 Laboratory Last Values











WBC  4.0 Th/cmm (4.8-10.8)  L  01/21/17  07:00    


 


RBC  4.41 Mil/cmm (4.30-5.70)   01/21/17  07:00    


 


Hgb  13.9 gm/dL (13.2-17.3)   01/21/17  07:00    


 


Hct  41.4 % (39.0-49.0)   01/21/17  07:00    


 


MCV  94.1 fl (80-99)   01/21/17  07:00    


 


MCH  31.5 pg (26.0-30.0)  H  01/21/17  07:00    


 


MCHC Differential  33.5 pg (28.0-36.0)   01/21/17  07:00    


 


RDW  12.7 % (11.5-20.0)   01/21/17  07:00    


 


Plt Count  265 Th/cmm (150-400)   01/21/17  07:00    


 


MPV  6.8 fl  01/21/17  07:00    


 


Neutrophils %  46.0 % (40.0-80.0)   01/21/17  07:00    


 


Lymphocytes %  33.9 % (20.0-50.0)   01/21/17  07:00    


 


Monocytes %  11.7 % (2.0-10.0)  H  01/21/17  07:00    


 


Eosinophils %  7.7 % (0.0-5.0)  H  01/21/17  07:00    


 


Basophils %  0.7 % (0.0-2.0)   01/21/17  07:00    


 


PT  10.5 SECONDS (9.5-11.5)   01/18/17  05:31    


 


INR  1.06  (0.5-1.4)   01/18/17  05:31    


 


Sodium  137 mEq/L (136-145)   01/21/17  07:00    


 


Potassium  3.7 mEq/L (3.5-5.1)   01/21/17  07:00    


 


Chloride  109 mEq/L ()  H  01/21/17  07:00    


 


Carbon Dioxide  24.9 mEq/L (21.0-31.0)   01/21/17  07:00    


 


Anion Gap  6.8  (7.0-16.0)  L  01/21/17  07:00    


 


BUN  8 mg/dL (7-25)   01/21/17  07:00    


 


Creatinine  0.7 mg/dL (0.7-1.3)   01/21/17  07:00    


 


Est GFR ( Amer)  > 60.0 ml/min  01/21/17  07:00    


 


Est GFR (Non-Af Amer)  > 60.0 ml/min  01/21/17  07:00    


 


BUN/Creatinine Ratio  11.4   01/21/17  07:00    


 


Glucose  91 mg/dL ()   01/21/17  07:00    


 


Whole Bld Lactic Acid  1.06 mmol/L (0.60-2.00)   01/17/17  13:05    


 


Calcium  9.3 mg/dL (8.6-10.3)   01/21/17  07:00    


 


Total Bilirubin  0.4 mg/dL (0.3-1.0)   01/21/17  07:00    


 


AST  13 U/L (13-39)   01/21/17  07:00    


 


ALT  8 U/L (7-52)   01/21/17  07:00    


 


Alkaline Phosphatase  104 U/L ()   01/21/17  07:00    


 


Troponin I  < 0.01 ng/mL (0.01-0.05)  L  01/16/17  14:20    


 


Total Protein  6.5 gm/dL (6.0-8.3)   01/21/17  07:00    


 


Albumin  3.7 gm/dL (4.2-5.5)  L  01/21/17  07:00    


 


Globulin  2.8 gm/dL  01/21/17  07:00    


 


Albumin/Globulin Ratio  1.3  (1.0-1.8)   01/21/17  07:00    


 


Amylase  38 U/L ()   01/16/17  14:20    


 


Lipase  18 U/L (11-82)   01/16/17  14:20    


 


Urine Source  RANDOM   01/16/17  15:55    


 


Urine Color  YELLOW   01/16/17  15:55    


 


Urine Clarity  CLEAR  (CLEAR)   01/16/17  15:55    


 


Urine pH  5.5   01/16/17  15:55    


 


Ur Specific Gravity  1.030  (1.005-1.030)   01/16/17  15:55    


 


Urine Protein  100 mg/dL (NEGATIVE)  H  01/16/17  15:55    


 


Urine Glucose (UA)  NEGATIVE mg/dL (NEGATIVE)   01/16/17  15:55    


 


Urine Ketones  15 mg/dL (NEGATIVE)  H  01/16/17  15:55    


 


Urine Blood  MODERATE  (NEGATIVE)  H  01/16/17  15:55    


 


Urine Nitrate  NEGATIVE  (NEGATIVE)   01/16/17  15:55    


 


Urine Bilirubin  SMALL  (NEGATIVE)  H  01/16/17  15:55    


 


Urine Urobilinogen  0.2 E.U./dL (0.2 - 1.0)   01/16/17  15:55    


 


Ur Leukocyte Esterase  NEGATIVE  (NEGATIVE)   01/16/17  15:55    


 


Urine RBC  10-25 /hpf (0-5)  H  01/16/17  15:55    


 


Urine WBC  2-5 /hpf (0-5)  H  01/16/17  15:55    


 


Ur Epithelial Cells  NONE SEEN /lpf (FEW)   01/16/17  15:55    


 


Urine Bacteria  NONE SEEN /hpf (NONE SEEN)   01/16/17  15:55    














- Physical Exam


Vitals and I&O: 


 Vital Signs











Temp  97.9 F   01/21/17 08:26


 


Pulse  80   01/21/17 11:18


 


Resp  20   01/21/17 11:18


 


BP  124/100   01/21/17 08:26


 


Pulse Ox  96   01/21/17 11:18








 Intake & Output











 01/20/17 01/21/17 01/21/17





 18:59 06:59 18:59


 


Intake Total 2500 1550 


 


Output Total  2 


 


Balance 2500 1548 


 


Intake:   


 


  Intake, IV Amount 1200 1300 


 


    Levofloxacin 500mg/100mL  100 





    500 mg In 100 ml @ 100   





    mls/hr IV Q24HR [facility] Rx#:   





    [id number]   


 


    Sodium Chloride 0.9% 1, 1000 1000 





    000 ml @ 75 mls/hr IV .   





    [id number] [facility] Rx#:[id number]   


 


    metroNIDAZOLE 500mg/ 200 





    100mL 500 mg In 100 ml @   





    100 mls/hr IV Q8HR [facility] Rx   





    #:[id number]   


 


  Oral 1200 250 


 


  Other 100  


 


Output:   


 


  Urine  2 


 


Other:   


 


  # Voids 4  


 


  # Bowel Movements 1 0 


 


  Stool Characteristics  Liquid Liquid











Active Medications: 


 Current Medications





Albuterol Sulfate (Albuterol 2.5mg/3ml Neb Ud)  2.5 mg HHN QIDRT [facility]


   Stop: 03/21/17 06:59


   Last Admin: 01/21/17 11:18 Dose:  2.5 mg


Albuterol Sulfate (Albuterol 2.5mg/3ml Neb Ud)  2.5 mg HHN Q8HR PRN


   PRN Reason: Shortness of Breath


   Stop: 03/21/17 02:59


Amlodipine Besylate (Norvasc)  10 mg PO DAILY [facility]


   Stop: 03/18/17 08:59


   Last Admin: 01/21/17 08:15 Dose:  10 mg


Hydromorphone HCl (Dilaudid)  1 mg IVP Q6HR PRN


   PRN Reason: Pain (Moderate)


   Stop: 03/17/17 21:30


   Last Admin: 01/21/17 09:43 Dose:  1 mg


Sodium Chloride (Nacl 0.9%)  1,000 mls @ 75 mls/hr IV .[id number] [facility]


   Stop: 03/17/17 21:29


   Last Admin: 01/21/17 09:50 Dose:  75 mls/hr


Levofloxacin (Levaquin Pb)  500 mg in 100 mls @ 100 mls/hr IV Q24HR [facility]


   Stop: 03/17/17 21:29


   Last Infusion: 01/20/17 23:40 Dose:  Infused


Metronidazole (Flagyl)  500 mg in 100 mls @ 100 mls/hr IV Q8HR [facility]


   Stop: 03/18/17 04:59


   Last Admin: 01/21/17 12:15 Dose:  100 mls/hr


Ipratropium Bromide (Atrovent Neb 0.5mg/2.5ml)  0.5 mg HHN QIDRT [facility]


   Stop: 03/21/17 06:59


   Last Admin: 01/21/17 11:18 Dose:  0.5 mg


Propranolol HCl (Inderal)  40 mg PO BID [facility]


   Stop: 03/19/17 08:59


   Last Admin: 01/21/17 08:16 Dose:  40 mg








General: Alert, Oriented x3, Cooperative, No acute distress


HEENT: Atraumatic, PERRLA


Neck: Supple


Cardiovascular: Regular rate


Lungs: Clear to auscultation


Abdomen: Bowel sounds, Soft, Other (Non tender)


Extremities: Other (No edema)


Neurological: Normal gait


Skin: Other (Warm and dry)


Psych/Mental Status: Mental status NL





- Procedures


Procedures: 


 Procedures











Procedure Code Date


 


COLONOSCOPY AND BIOPSY 39538 01/16/17


 


EXCISION OF RIGHT LARGE INTESTINE, ENDO, DIAGN [id number] 01/16/17


 


INJECT/INFUSE NEC 99.29 03/30/15


 


VENOUS PUNCTURE NEC 38.99 10/18/14














Assessment/Plan





- Problem List


Patient Problems: 


All Active Problems





GENERALIZED ABDOMINAL PAIN (Acute) 


Abdominal pain (Active) R10.9


Kidney stone (Active 07/13/13) N20.0


Abdominal pain in male (Acute) R10.9


Dyspnea (Acute) R06.00


Flank pain (Acute 04/18/15) R10.9


Flank pain, chronic (Acute) R10.9


Left flank pain (Acute) R10.9


Lower abdominal pain (Acute) R10.30











- Assessment


Assessment: 


Pain is awake, alert, calm.  Dx: Abdominal pain possible secondary to colitis, 

UTI, HTN, COPD.  Biopsy shows Pseudomenbranose colitis.  HIDA scan normal, all 

labs are normal.  Patient continue refering abdominal pain and requesting more 

and more pain medication.  I belive pain is not that hard as patient is telling 

us.  Patient will be discharge





- Plan


Plan: 


 Patient will be discharge